# Patient Record
Sex: MALE | Race: WHITE | NOT HISPANIC OR LATINO | Employment: FULL TIME | URBAN - METROPOLITAN AREA
[De-identification: names, ages, dates, MRNs, and addresses within clinical notes are randomized per-mention and may not be internally consistent; named-entity substitution may affect disease eponyms.]

---

## 2020-06-23 ENCOUNTER — OFFICE VISIT (OUTPATIENT)
Dept: URGENT CARE | Facility: CLINIC | Age: 25
End: 2020-06-23
Payer: COMMERCIAL

## 2020-06-23 VITALS — HEART RATE: 72 BPM | OXYGEN SATURATION: 98 % | TEMPERATURE: 98 F

## 2020-06-23 DIAGNOSIS — Z20.822 CLOSE EXPOSURE TO COVID-19 VIRUS: ICD-10-CM

## 2020-06-23 DIAGNOSIS — Z11.59 SCREENING FOR VIRAL DISEASE: Primary | ICD-10-CM

## 2020-06-23 PROCEDURE — 99214 OFFICE O/P EST MOD 30 MIN: CPT | Mod: S$GLB,,, | Performed by: NURSE PRACTITIONER

## 2020-06-23 PROCEDURE — U0003 INFECTIOUS AGENT DETECTION BY NUCLEIC ACID (DNA OR RNA); SEVERE ACUTE RESPIRATORY SYNDROME CORONAVIRUS 2 (SARS-COV-2) (CORONAVIRUS DISEASE [COVID-19]), AMPLIFIED PROBE TECHNIQUE, MAKING USE OF HIGH THROUGHPUT TECHNOLOGIES AS DESCRIBED BY CMS-2020-01-R: HCPCS

## 2020-06-23 PROCEDURE — 99214 PR OFFICE/OUTPT VISIT, EST, LEVL IV, 30-39 MIN: ICD-10-PCS | Mod: S$GLB,,, | Performed by: NURSE PRACTITIONER

## 2020-06-23 NOTE — PATIENT INSTRUCTIONS
Someone will call you from [facility] within 24-48 hours to discuss lab results.       Instructions for Patients with Confirmed or Suspected COVID-19    If you are awaiting your test result, you will either be called or it will be released to the patient portal.  If you have any questions about your test, please visit [email or web address] or call our COVID-19 information line at [phone].       Stay home and stay away from family members and friends. The CDC says, you can leave home after these three things have happened: 1) You have had no fever for at least 72 hours (that is three full days of no fever without the use of medicine that reduces fevers) 2) AND other symptoms have improved (for example, when your cough or shortness of breath have improved) 3) AND at least 10 days have passed since your symptoms first appeared.   Separate yourself from other people and animals in your home.   Call ahead before visiting your doctor.   Wear a facemask.   Cover your coughs and sneezes.   Wash your hands often with soap and water; hand  can be used, too.   Avoid sharing personal household items.   Wipe down surfaces used daily.   Monitor your symptoms. Seek prompt medical attention if your illness is worsening (e.g., difficulty breathing).    Before seeking care, call your healthcare provider.   If you have a medical emergency and need to call 911, notify the dispatch personnel that you have, or are being evaluated for COVID-19. If possible, put on a facemask before emergency medical services arrive.        Recommended precautions for household members, intimate partners, and caregivers in a home setting of a patient with symptomatic laboratory-confirmed COVID-19 or a patient under investigation.  Household members, intimate partners, and caregivers in the home setting awaiting tests results have close contact with a person with symptomatic, laboratory-confirmed COVID-19 or a person under  investigation. Close contacts should monitor their health; they should call their provider right away if they develop symptoms suggestive of COVID-19 (e.g., fever, cough, shortness of breath).    Close contacts should also follow these recommendations:   Make sure that you understand and can help the patient follow their provider's instructions for medication(s) and care. You should help the patient with basic needs in the home and provide support for getting groceries, prescriptions, and other personal needs.   Monitor the patient's symptoms. If the patient is getting sicker, call his or her healthcare provider and tell them that the patient has laboratory-confirmed COVID-19. If the patient has a medical emergency and you need to call 911, notify the dispatch personnel that the patient has, or is being evaluated for COVID-19.   Household members should stay in another room or be  from the patient. Household members should use a separate bedroom and bathroom, if available.   Prohibit visitors.   Household members should care for any pets in the home.   Make sure that shared spaces in the home have good air flow, such as by an air conditioner or an opened window, weather permitting.   Perform hand hygiene frequently. Wash your hands often with soap and water for at least 20 seconds or use an alcohol-based hand  (that contains > 60% alcohol) covering all surfaces of your hands and rubbing them together until they feel dry. Soap and water should be used preferentially.   Avoid touching your eyes, nose, and mouth.   The patient should wear a facemask. If the patient is not able to wear a facemask (for example, because it causes trouble breathing), caregivers should wear a mask when they are in the same room as the patient.   Wear a disposable facemask and gloves when you touch or have contact with the patient's blood, stool, or body fluids, such as saliva, sputum, nasal mucus, vomit,  urine.  o Throw out disposable facemasks and gloves after using them. Do not reuse.  o When removing personal protective equipment, first remove and dispose of gloves. Then, immediately clean your hands with soap and water or alcohol-based hand . Next, remove and dispose of facemask, and immediately clean your hands again with soap and water or alcohol-based hand .   You should not share dishes, drinking glasses, cups, eating utensils, towels, bedding, or other items with the patient. After the patient uses these items, you should wash them thoroughly (see below Wash laundry thoroughly).   Clean all high-touch surfaces, such as counters, tabletops, doorknobs, bathroom fixtures, toilets, phones, keyboards, tablets, and bedside tables, every day. Also, clean any surfaces that may have blood, stool, or body fluids on them.   Use a household cleaning spray or wipe, according to the label instructions. Labels contain instructions for safe and effective use of the cleaning product including precautions you should take when applying the product, such as wearing gloves and making sure you have good ventilation during use of the product.   Wash laundry thoroughly.  o Immediately remove and wash clothes or bedding that have blood, stool, or body fluids on them.  o Wear disposable gloves while handling soiled items and keep soiled items away from your body. Clean your hands (with soap and water or an alcohol-based hand ) immediately after removing your gloves.  o Read and follow directions on labels of laundry or clothing items and detergent. In general, using a normal laundry detergent according to washing machine instructions and dry thoroughly using the warmest temperatures recommended on the clothing label.   Place all used disposable gloves, facemasks, and other contaminated items in a lined container before disposing of them with other household waste. Clean your hands (with soap and  water or an alcohol-based hand ) immediately after handling these items. Soap and water should be used preferentially if hands are visibly dirty.   Discuss any additional questions with your state or local health department or healthcare provider. Check available hours when contacting your local health department.    For more information see CDC link below.      https://www.cdc.gov/coronavirus/2019-ncov/hcp/guidance-prevent-spread.html#precautions        Sources:  CDC, [place] Department of Health and [facility]    If you were prescribed a narcotic or controlled medication, do not drive or operate heavy equipment or machinery while taking these medications.  You must understand that you've received an Urgent Care treatment only and that you may be released before all your medical problems are known or treated. You, the patient, will arrange for follow up care as instructed.  Follow up with your PCP or specialty clinic as directed within 2-5 days if not improved or as needed.  You can call [phone] to schedule an appointment with the appropriate provider.  If your condition worsens we recommend that you receive another evaluation at the emergency room immediately or contact your primary medical clinics after hours call service to discuss your concerns.  Please return here or go to the Emergency Department for any concerns or worsening of condition.

## 2020-06-23 NOTE — PROGRESS NOTES
Subjective:       Patient ID: Yamilka Azul is a 24 y.o. male.    Vitals:  temperature is 98.1 °F (36.7 °C). His pulse is 72. His oxygen saturation is 98%.     Chief Complaint: Sore Throat    Patient presents with c.o sore throat and fatigue that started on Friday. Patient reports exposure to covid. Patient with a intermittent cough. No sob or fever.     Sore Throat   This is a new problem. Episode onset: Friday. The problem has been unchanged. Neither side of throat is experiencing more pain than the other. There has been no fever. Associated symptoms include diarrhea and headaches. Pertinent negatives include no congestion, coughing, ear pain, shortness of breath, stridor or vomiting. Treatments tried: advil. The treatment provided moderate relief.       Constitution: Positive for fatigue. Negative for chills, sweating and fever.   HENT: Positive for sore throat. Negative for ear pain, congestion, sinus pain, sinus pressure and voice change.    Neck: Negative for painful lymph nodes.   Eyes: Negative for eye redness.   Respiratory: Negative for chest tightness, cough, sputum production, bloody sputum, COPD, shortness of breath, stridor, wheezing and asthma.    Gastrointestinal: Positive for diarrhea. Negative for nausea and vomiting.   Musculoskeletal: Negative for muscle ache.   Skin: Negative for rash.   Allergic/Immunologic: Negative for seasonal allergies and asthma.   Neurological: Positive for headaches.   Hematologic/Lymphatic: Negative for swollen lymph nodes.       Objective:      Physical Exam   Patient was seen remotely due to State of Emergency for the COVID-19 outbreak.  Patient agreed to remote visit. Patient did not appear to be in any distress via remote visit. Patient reported recent Close exposure to COVID. ER precautions discussed. Patient verbalized understanding.       Assessment:       1. Screening for viral disease    2. Close Exposure to Covid-19 Virus        Plan:         Screening for  viral disease  -     COVID-19 Routine Screening    Close Exposure to Covid-19 Virus  -     COVID-19 Routine Screening      Patient Instructions   Someone will call you from [facility] within 24-48 hours to discuss lab results.       Instructions for Patients with Confirmed or Suspected COVID-19    If you are awaiting your test result, you will either be called or it will be released to the patient portal.  If you have any questions about your test, please visit [email or web address] or call our COVID-19 information line at [phone].       Stay home and stay away from family members and friends. The CDC says, you can leave home after these three things have happened: 1) You have had no fever for at least 72 hours (that is three full days of no fever without the use of medicine that reduces fevers) 2) AND other symptoms have improved (for example, when your cough or shortness of breath have improved) 3) AND at least 10 days have passed since your symptoms first appeared.   Separate yourself from other people and animals in your home.   Call ahead before visiting your doctor.   Wear a facemask.   Cover your coughs and sneezes.   Wash your hands often with soap and water; hand  can be used, too.   Avoid sharing personal household items.   Wipe down surfaces used daily.   Monitor your symptoms. Seek prompt medical attention if your illness is worsening (e.g., difficulty breathing).    Before seeking care, call your healthcare provider.   If you have a medical emergency and need to call 911, notify the dispatch personnel that you have, or are being evaluated for COVID-19. If possible, put on a facemask before emergency medical services arrive.        Recommended precautions for household members, intimate partners, and caregivers in a home setting of a patient with symptomatic laboratory-confirmed COVID-19 or a patient under investigation.  Household members, intimate partners, and caregivers  in the home setting awaiting tests results have close contact with a person with symptomatic, laboratory-confirmed COVID-19 or a person under investigation. Close contacts should monitor their health; they should call their provider right away if they develop symptoms suggestive of COVID-19 (e.g., fever, cough, shortness of breath).    Close contacts should also follow these recommendations:   Make sure that you understand and can help the patient follow their provider's instructions for medication(s) and care. You should help the patient with basic needs in the home and provide support for getting groceries, prescriptions, and other personal needs.   Monitor the patient's symptoms. If the patient is getting sicker, call his or her healthcare provider and tell them that the patient has laboratory-confirmed COVID-19. If the patient has a medical emergency and you need to call 911, notify the dispatch personnel that the patient has, or is being evaluated for COVID-19.   Household members should stay in another room or be  from the patient. Household members should use a separate bedroom and bathroom, if available.   Prohibit visitors.   Household members should care for any pets in the home.   Make sure that shared spaces in the home have good air flow, such as by an air conditioner or an opened window, weather permitting.   Perform hand hygiene frequently. Wash your hands often with soap and water for at least 20 seconds or use an alcohol-based hand  (that contains > 60% alcohol) covering all surfaces of your hands and rubbing them together until they feel dry. Soap and water should be used preferentially.   Avoid touching your eyes, nose, and mouth.   The patient should wear a facemask. If the patient is not able to wear a facemask (for example, because it causes trouble breathing), caregivers should wear a mask when they are in the same room as the patient.   Wear a disposable facemask  and gloves when you touch or have contact with the patient's blood, stool, or body fluids, such as saliva, sputum, nasal mucus, vomit, urine.  o Throw out disposable facemasks and gloves after using them. Do not reuse.  o When removing personal protective equipment, first remove and dispose of gloves. Then, immediately clean your hands with soap and water or alcohol-based hand . Next, remove and dispose of facemask, and immediately clean your hands again with soap and water or alcohol-based hand .   You should not share dishes, drinking glasses, cups, eating utensils, towels, bedding, or other items with the patient. After the patient uses these items, you should wash them thoroughly (see below Wash laundry thoroughly).   Clean all high-touch surfaces, such as counters, tabletops, doorknobs, bathroom fixtures, toilets, phones, keyboards, tablets, and bedside tables, every day. Also, clean any surfaces that may have blood, stool, or body fluids on them.   Use a household cleaning spray or wipe, according to the label instructions. Labels contain instructions for safe and effective use of the cleaning product including precautions you should take when applying the product, such as wearing gloves and making sure you have good ventilation during use of the product.   Wash laundry thoroughly.  o Immediately remove and wash clothes or bedding that have blood, stool, or body fluids on them.  o Wear disposable gloves while handling soiled items and keep soiled items away from your body. Clean your hands (with soap and water or an alcohol-based hand ) immediately after removing your gloves.  o Read and follow directions on labels of laundry or clothing items and detergent. In general, using a normal laundry detergent according to washing machine instructions and dry thoroughly using the warmest temperatures recommended on the clothing label.   Place all used disposable gloves, facemasks,  and other contaminated items in a lined container before disposing of them with other household waste. Clean your hands (with soap and water or an alcohol-based hand ) immediately after handling these items. Soap and water should be used preferentially if hands are visibly dirty.   Discuss any additional questions with your state or local health department or healthcare provider. Check available hours when contacting your local health department.    For more information see CDC link below.      https://www.cdc.gov/coronavirus/2019-ncov/hcp/guidance-prevent-spread.html#precautions        Sources:  [facility], [place] Department [facility] and [facility]    If you were prescribed a narcotic or controlled medication, do not drive or operate heavy equipment or machinery while taking these medications.  You must understand that you've received an Urgent Care treatment only and that you may be released before all your medical problems are known or treated. You, the patient, will arrange for follow up care as instructed.  Follow up with your PCP or specialty clinic as directed within 2-5 days if not improved or as needed.  You can call [phone] to schedule an appointment with the appropriate provider.  If your condition worsens we recommend that you receive another evaluation at the emergency room immediately or contact your primary medical clinics after hours call service to discuss your concerns.  Please return here or go to the Emergency Department for any concerns or worsening of condition.

## 2020-06-25 LAB — SARS-COV-2 RNA RESP QL NAA+PROBE: NOT DETECTED

## 2021-02-22 ENCOUNTER — TELEPHONE (OUTPATIENT)
Dept: ENDOCRINOLOGY | Facility: CLINIC | Age: 26
End: 2021-02-22

## 2021-02-25 ENCOUNTER — TELEPHONE (OUTPATIENT)
Dept: ENDOCRINOLOGY | Facility: CLINIC | Age: 26
End: 2021-02-25

## 2021-02-26 ENCOUNTER — PATIENT OUTREACH (OUTPATIENT)
Dept: ENDOCRINOLOGY | Facility: CLINIC | Age: 26
End: 2021-02-26

## 2021-02-26 ENCOUNTER — OFFICE VISIT (OUTPATIENT)
Dept: ENDOCRINOLOGY | Facility: CLINIC | Age: 26
End: 2021-02-26
Payer: COMMERCIAL

## 2021-02-26 ENCOUNTER — LAB VISIT (OUTPATIENT)
Dept: LAB | Facility: HOSPITAL | Age: 26
End: 2021-02-26
Attending: INTERNAL MEDICINE
Payer: COMMERCIAL

## 2021-02-26 ENCOUNTER — TELEPHONE (OUTPATIENT)
Dept: ENDOCRINOLOGY | Facility: CLINIC | Age: 26
End: 2021-02-26

## 2021-02-26 VITALS
SYSTOLIC BLOOD PRESSURE: 128 MMHG | WEIGHT: 172.06 LBS | DIASTOLIC BLOOD PRESSURE: 86 MMHG | OXYGEN SATURATION: 99 % | BODY MASS INDEX: 24.63 KG/M2 | RESPIRATION RATE: 17 BRPM | HEIGHT: 70 IN | HEART RATE: 77 BPM

## 2021-02-26 DIAGNOSIS — Z96.41 INSULIN PUMP STATUS: ICD-10-CM

## 2021-02-26 DIAGNOSIS — E10.9 TYPE 1 DIABETES MELLITUS WITHOUT COMPLICATION: Primary | ICD-10-CM

## 2021-02-26 DIAGNOSIS — E10.9 TYPE 1 DIABETES MELLITUS WITHOUT COMPLICATION: ICD-10-CM

## 2021-02-26 LAB
ESTIMATED AVG GLUCOSE: 148 MG/DL (ref 68–131)
HBA1C MFR BLD: 6.8 % (ref 4–5.6)

## 2021-02-26 PROCEDURE — 3008F PR BODY MASS INDEX (BMI) DOCUMENTED: ICD-10-PCS | Mod: CPTII,S$GLB,, | Performed by: INTERNAL MEDICINE

## 2021-02-26 PROCEDURE — 83036 HEMOGLOBIN GLYCOSYLATED A1C: CPT

## 2021-02-26 PROCEDURE — 99999 PR PBB SHADOW E&M-EST. PATIENT-LVL IV: ICD-10-PCS | Mod: PBBFAC,,, | Performed by: INTERNAL MEDICINE

## 2021-02-26 PROCEDURE — 99999 PR PBB SHADOW E&M-EST. PATIENT-LVL IV: CPT | Mod: PBBFAC,,, | Performed by: INTERNAL MEDICINE

## 2021-02-26 PROCEDURE — 1126F AMNT PAIN NOTED NONE PRSNT: CPT | Mod: S$GLB,,, | Performed by: INTERNAL MEDICINE

## 2021-02-26 PROCEDURE — 3008F BODY MASS INDEX DOCD: CPT | Mod: CPTII,S$GLB,, | Performed by: INTERNAL MEDICINE

## 2021-02-26 PROCEDURE — 99205 PR OFFICE/OUTPT VISIT, NEW, LEVL V, 60-74 MIN: ICD-10-PCS | Mod: S$GLB,,, | Performed by: INTERNAL MEDICINE

## 2021-02-26 PROCEDURE — 99205 OFFICE O/P NEW HI 60 MIN: CPT | Mod: S$GLB,,, | Performed by: INTERNAL MEDICINE

## 2021-02-26 PROCEDURE — 80061 LIPID PANEL: CPT

## 2021-02-26 PROCEDURE — 84443 ASSAY THYROID STIM HORMONE: CPT

## 2021-02-26 PROCEDURE — 1126F PR PAIN SEVERITY QUANTIFIED, NO PAIN PRESENT: ICD-10-PCS | Mod: S$GLB,,, | Performed by: INTERNAL MEDICINE

## 2021-02-26 PROCEDURE — 36415 COLL VENOUS BLD VENIPUNCTURE: CPT

## 2021-02-26 PROCEDURE — 80053 COMPREHEN METABOLIC PANEL: CPT

## 2021-02-26 RX ORDER — INSULIN GLARGINE 100 [IU]/ML
INJECTION, SOLUTION SUBCUTANEOUS
Qty: 3 SYRINGE | Refills: 1 | Status: SHIPPED | OUTPATIENT
Start: 2021-02-26 | End: 2021-02-26

## 2021-02-26 RX ORDER — INSULIN GLARGINE 100 [IU]/ML
INJECTION, SOLUTION SUBCUTANEOUS
Qty: 3 SYRINGE | Refills: 1 | Status: SHIPPED | OUTPATIENT
Start: 2021-02-26

## 2021-02-26 RX ORDER — GLUCAGON 3 MG/1
1 POWDER NASAL
Qty: 2 EACH | Refills: 3 | Status: SHIPPED | OUTPATIENT
Start: 2021-02-26 | End: 2023-03-10

## 2021-02-27 LAB
ALBUMIN SERPL BCP-MCNC: 4.4 G/DL (ref 3.5–5.2)
ALP SERPL-CCNC: 84 U/L (ref 55–135)
ALT SERPL W/O P-5'-P-CCNC: 44 U/L (ref 10–44)
ANION GAP SERPL CALC-SCNC: 6 MMOL/L (ref 8–16)
AST SERPL-CCNC: 22 U/L (ref 10–40)
BILIRUB SERPL-MCNC: 0.7 MG/DL (ref 0.1–1)
BUN SERPL-MCNC: 12 MG/DL (ref 6–20)
CALCIUM SERPL-MCNC: 9.4 MG/DL (ref 8.7–10.5)
CHLORIDE SERPL-SCNC: 105 MMOL/L (ref 95–110)
CHOLEST SERPL-MCNC: 179 MG/DL (ref 120–199)
CHOLEST/HDLC SERPL: 2.5 {RATIO} (ref 2–5)
CO2 SERPL-SCNC: 29 MMOL/L (ref 23–29)
CREAT SERPL-MCNC: 1 MG/DL (ref 0.5–1.4)
EST. GFR  (AFRICAN AMERICAN): >60 ML/MIN/1.73 M^2
EST. GFR  (NON AFRICAN AMERICAN): >60 ML/MIN/1.73 M^2
GLUCOSE SERPL-MCNC: 199 MG/DL (ref 70–110)
HDLC SERPL-MCNC: 72 MG/DL (ref 40–75)
HDLC SERPL: 40.2 % (ref 20–50)
LDLC SERPL CALC-MCNC: 101.2 MG/DL (ref 63–159)
NONHDLC SERPL-MCNC: 107 MG/DL
POTASSIUM SERPL-SCNC: 5 MMOL/L (ref 3.5–5.1)
PROT SERPL-MCNC: 7 G/DL (ref 6–8.4)
SODIUM SERPL-SCNC: 140 MMOL/L (ref 136–145)
TRIGL SERPL-MCNC: 29 MG/DL (ref 30–150)
TSH SERPL DL<=0.005 MIU/L-ACNC: 0.53 UIU/ML (ref 0.4–4)

## 2021-02-28 ENCOUNTER — PATIENT MESSAGE (OUTPATIENT)
Dept: ENDOCRINOLOGY | Facility: CLINIC | Age: 26
End: 2021-02-28

## 2021-04-30 ENCOUNTER — OFFICE VISIT (OUTPATIENT)
Dept: ENDOCRINOLOGY | Facility: CLINIC | Age: 26
End: 2021-04-30
Payer: COMMERCIAL

## 2021-04-30 VITALS
WEIGHT: 168.19 LBS | SYSTOLIC BLOOD PRESSURE: 110 MMHG | DIASTOLIC BLOOD PRESSURE: 76 MMHG | HEART RATE: 76 BPM | BODY MASS INDEX: 24.08 KG/M2 | HEIGHT: 70 IN | RESPIRATION RATE: 16 BRPM

## 2021-04-30 DIAGNOSIS — Z96.41 INSULIN PUMP STATUS: ICD-10-CM

## 2021-04-30 DIAGNOSIS — E10.9 TYPE 1 DIABETES MELLITUS WITHOUT COMPLICATION: Primary | ICD-10-CM

## 2021-04-30 PROCEDURE — 99214 PR OFFICE/OUTPT VISIT, EST, LEVL IV, 30-39 MIN: ICD-10-PCS | Mod: 25,S$GLB,, | Performed by: INTERNAL MEDICINE

## 2021-04-30 PROCEDURE — 3044F PR MOST RECENT HEMOGLOBIN A1C LEVEL <7.0%: ICD-10-PCS | Mod: CPTII,S$GLB,, | Performed by: INTERNAL MEDICINE

## 2021-04-30 PROCEDURE — 3008F BODY MASS INDEX DOCD: CPT | Mod: CPTII,S$GLB,, | Performed by: INTERNAL MEDICINE

## 2021-04-30 PROCEDURE — 99999 PR PBB SHADOW E&M-EST. PATIENT-LVL IV: CPT | Mod: PBBFAC,,, | Performed by: INTERNAL MEDICINE

## 2021-04-30 PROCEDURE — 95251 CONT GLUC MNTR ANALYSIS I&R: CPT | Mod: S$GLB,,, | Performed by: INTERNAL MEDICINE

## 2021-04-30 PROCEDURE — 99214 OFFICE O/P EST MOD 30 MIN: CPT | Mod: 25,S$GLB,, | Performed by: INTERNAL MEDICINE

## 2021-04-30 PROCEDURE — 3008F PR BODY MASS INDEX (BMI) DOCUMENTED: ICD-10-PCS | Mod: CPTII,S$GLB,, | Performed by: INTERNAL MEDICINE

## 2021-04-30 PROCEDURE — 95251 PR GLUCOSE MONITOR, 72 HOUR, PHYS INTERP: ICD-10-PCS | Mod: S$GLB,,, | Performed by: INTERNAL MEDICINE

## 2021-04-30 PROCEDURE — 99999 PR PBB SHADOW E&M-EST. PATIENT-LVL IV: ICD-10-PCS | Mod: PBBFAC,,, | Performed by: INTERNAL MEDICINE

## 2021-04-30 PROCEDURE — 3044F HG A1C LEVEL LT 7.0%: CPT | Mod: CPTII,S$GLB,, | Performed by: INTERNAL MEDICINE

## 2021-04-30 PROCEDURE — 1126F AMNT PAIN NOTED NONE PRSNT: CPT | Mod: S$GLB,,, | Performed by: INTERNAL MEDICINE

## 2021-04-30 PROCEDURE — 1126F PR PAIN SEVERITY QUANTIFIED, NO PAIN PRESENT: ICD-10-PCS | Mod: S$GLB,,, | Performed by: INTERNAL MEDICINE

## 2021-05-14 ENCOUNTER — OFFICE VISIT (OUTPATIENT)
Dept: OPTOMETRY | Facility: CLINIC | Age: 26
End: 2021-05-14
Payer: COMMERCIAL

## 2021-05-14 DIAGNOSIS — E10.9 TYPE 1 DIABETES MELLITUS WITHOUT COMPLICATION: Primary | ICD-10-CM

## 2021-05-14 DIAGNOSIS — H52.13 MYOPIA OF BOTH EYES: ICD-10-CM

## 2021-05-14 PROCEDURE — 2023F PR DILATED RETINAL EXAM W/O EVID OF RETINOPATHY: ICD-10-PCS | Mod: S$GLB,,, | Performed by: OPTOMETRIST

## 2021-05-14 PROCEDURE — 92015 PR REFRACTION: ICD-10-PCS | Mod: S$GLB,,, | Performed by: OPTOMETRIST

## 2021-05-14 PROCEDURE — 92015 DETERMINE REFRACTIVE STATE: CPT | Mod: S$GLB,,, | Performed by: OPTOMETRIST

## 2021-05-14 PROCEDURE — 3044F HG A1C LEVEL LT 7.0%: CPT | Mod: CPTII,S$GLB,, | Performed by: OPTOMETRIST

## 2021-05-14 PROCEDURE — 3044F PR MOST RECENT HEMOGLOBIN A1C LEVEL <7.0%: ICD-10-PCS | Mod: CPTII,S$GLB,, | Performed by: OPTOMETRIST

## 2021-05-14 PROCEDURE — 1126F PR PAIN SEVERITY QUANTIFIED, NO PAIN PRESENT: ICD-10-PCS | Mod: S$GLB,,, | Performed by: OPTOMETRIST

## 2021-05-14 PROCEDURE — 2023F DILAT RTA XM W/O RTNOPTHY: CPT | Mod: S$GLB,,, | Performed by: OPTOMETRIST

## 2021-05-14 PROCEDURE — 1126F AMNT PAIN NOTED NONE PRSNT: CPT | Mod: S$GLB,,, | Performed by: OPTOMETRIST

## 2021-05-14 PROCEDURE — 99999 PR PBB SHADOW E&M-EST. PATIENT-LVL II: CPT | Mod: PBBFAC,,, | Performed by: OPTOMETRIST

## 2021-05-14 PROCEDURE — 92004 COMPRE OPH EXAM NEW PT 1/>: CPT | Mod: S$GLB,,, | Performed by: OPTOMETRIST

## 2021-05-14 PROCEDURE — 99999 PR PBB SHADOW E&M-EST. PATIENT-LVL II: ICD-10-PCS | Mod: PBBFAC,,, | Performed by: OPTOMETRIST

## 2021-05-14 PROCEDURE — 92004 PR EYE EXAM, NEW PATIENT,COMPREHESV: ICD-10-PCS | Mod: S$GLB,,, | Performed by: OPTOMETRIST

## 2021-07-23 ENCOUNTER — LAB VISIT (OUTPATIENT)
Dept: LAB | Facility: HOSPITAL | Age: 26
End: 2021-07-23
Attending: INTERNAL MEDICINE
Payer: COMMERCIAL

## 2021-07-23 DIAGNOSIS — E10.9 TYPE 1 DIABETES MELLITUS WITHOUT COMPLICATION: ICD-10-CM

## 2021-07-23 LAB
ALBUMIN SERPL BCP-MCNC: 4.3 G/DL (ref 3.5–5.2)
ALP SERPL-CCNC: 96 U/L (ref 55–135)
ALT SERPL W/O P-5'-P-CCNC: 78 U/L (ref 10–44)
ANION GAP SERPL CALC-SCNC: 9 MMOL/L (ref 8–16)
AST SERPL-CCNC: 59 U/L (ref 10–40)
BILIRUB SERPL-MCNC: 0.4 MG/DL (ref 0.1–1)
BUN SERPL-MCNC: 13 MG/DL (ref 6–20)
C PEPTIDE SERPL-MCNC: <0.08 NG/ML (ref 0.78–5.19)
CALCIUM SERPL-MCNC: 10 MG/DL (ref 8.7–10.5)
CHLORIDE SERPL-SCNC: 107 MMOL/L (ref 95–110)
CO2 SERPL-SCNC: 26 MMOL/L (ref 23–29)
CREAT SERPL-MCNC: 0.9 MG/DL (ref 0.5–1.4)
EST. GFR  (AFRICAN AMERICAN): >60 ML/MIN/1.73 M^2
EST. GFR  (NON AFRICAN AMERICAN): >60 ML/MIN/1.73 M^2
ESTIMATED AVG GLUCOSE: 131 MG/DL (ref 68–131)
GLUCOSE SERPL-MCNC: 125 MG/DL (ref 70–110)
HBA1C MFR BLD: 6.2 % (ref 4–5.6)
POTASSIUM SERPL-SCNC: 4.2 MMOL/L (ref 3.5–5.1)
PROT SERPL-MCNC: 7.2 G/DL (ref 6–8.4)
SODIUM SERPL-SCNC: 142 MMOL/L (ref 136–145)

## 2021-07-23 PROCEDURE — 83036 HEMOGLOBIN GLYCOSYLATED A1C: CPT | Performed by: INTERNAL MEDICINE

## 2021-07-23 PROCEDURE — 86341 ISLET CELL ANTIBODY: CPT | Mod: 91 | Performed by: INTERNAL MEDICINE

## 2021-07-23 PROCEDURE — 36415 COLL VENOUS BLD VENIPUNCTURE: CPT | Performed by: INTERNAL MEDICINE

## 2021-07-23 PROCEDURE — 84681 ASSAY OF C-PEPTIDE: CPT | Performed by: INTERNAL MEDICINE

## 2021-07-23 PROCEDURE — 80053 COMPREHEN METABOLIC PANEL: CPT | Performed by: INTERNAL MEDICINE

## 2021-07-23 PROCEDURE — 86341 ISLET CELL ANTIBODY: CPT | Performed by: INTERNAL MEDICINE

## 2021-07-26 LAB — GAD65 AB SER-SCNC: 0 NMOL/L

## 2021-07-27 LAB — PANC ISLET CELL IGG SER-ACNC: NORMAL

## 2021-07-28 ENCOUNTER — PATIENT MESSAGE (OUTPATIENT)
Dept: ENDOCRINOLOGY | Facility: CLINIC | Age: 26
End: 2021-07-28

## 2021-07-28 DIAGNOSIS — R79.89 ELEVATED LFTS: Primary | ICD-10-CM

## 2021-08-06 ENCOUNTER — OFFICE VISIT (OUTPATIENT)
Dept: URGENT CARE | Facility: CLINIC | Age: 26
End: 2021-08-06
Payer: COMMERCIAL

## 2021-08-06 VITALS
WEIGHT: 168 LBS | OXYGEN SATURATION: 97 % | HEART RATE: 83 BPM | TEMPERATURE: 99 F | SYSTOLIC BLOOD PRESSURE: 145 MMHG | DIASTOLIC BLOOD PRESSURE: 81 MMHG | HEIGHT: 70 IN | BODY MASS INDEX: 24.05 KG/M2

## 2021-08-06 DIAGNOSIS — R05.9 COUGH: ICD-10-CM

## 2021-08-06 DIAGNOSIS — J20.9 ACUTE BRONCHITIS, UNSPECIFIED ORGANISM: Primary | ICD-10-CM

## 2021-08-06 LAB
CTP QC/QA: YES
SARS-COV-2 RDRP RESP QL NAA+PROBE: NEGATIVE

## 2021-08-06 PROCEDURE — 1159F MED LIST DOCD IN RCRD: CPT | Mod: CPTII,S$GLB,, | Performed by: PHYSICIAN ASSISTANT

## 2021-08-06 PROCEDURE — 3077F PR MOST RECENT SYSTOLIC BLOOD PRESSURE >= 140 MM HG: ICD-10-PCS | Mod: CPTII,S$GLB,, | Performed by: PHYSICIAN ASSISTANT

## 2021-08-06 PROCEDURE — 3044F HG A1C LEVEL LT 7.0%: CPT | Mod: CPTII,S$GLB,, | Performed by: PHYSICIAN ASSISTANT

## 2021-08-06 PROCEDURE — 3079F PR MOST RECENT DIASTOLIC BLOOD PRESSURE 80-89 MM HG: ICD-10-PCS | Mod: CPTII,S$GLB,, | Performed by: PHYSICIAN ASSISTANT

## 2021-08-06 PROCEDURE — 3008F PR BODY MASS INDEX (BMI) DOCUMENTED: ICD-10-PCS | Mod: CPTII,S$GLB,, | Performed by: PHYSICIAN ASSISTANT

## 2021-08-06 PROCEDURE — 1159F PR MEDICATION LIST DOCUMENTED IN MEDICAL RECORD: ICD-10-PCS | Mod: CPTII,S$GLB,, | Performed by: PHYSICIAN ASSISTANT

## 2021-08-06 PROCEDURE — 1126F PR PAIN SEVERITY QUANTIFIED, NO PAIN PRESENT: ICD-10-PCS | Mod: CPTII,S$GLB,, | Performed by: PHYSICIAN ASSISTANT

## 2021-08-06 PROCEDURE — 71046 X-RAY EXAM CHEST 2 VIEWS: CPT | Mod: FY,S$GLB,, | Performed by: STUDENT IN AN ORGANIZED HEALTH CARE EDUCATION/TRAINING PROGRAM

## 2021-08-06 PROCEDURE — 3079F DIAST BP 80-89 MM HG: CPT | Mod: CPTII,S$GLB,, | Performed by: PHYSICIAN ASSISTANT

## 2021-08-06 PROCEDURE — 99214 PR OFFICE/OUTPT VISIT, EST, LEVL IV, 30-39 MIN: ICD-10-PCS | Mod: S$GLB,,, | Performed by: PHYSICIAN ASSISTANT

## 2021-08-06 PROCEDURE — 3077F SYST BP >= 140 MM HG: CPT | Mod: CPTII,S$GLB,, | Performed by: PHYSICIAN ASSISTANT

## 2021-08-06 PROCEDURE — 1160F PR REVIEW ALL MEDS BY PRESCRIBER/CLIN PHARMACIST DOCUMENTED: ICD-10-PCS | Mod: CPTII,S$GLB,, | Performed by: PHYSICIAN ASSISTANT

## 2021-08-06 PROCEDURE — 1126F AMNT PAIN NOTED NONE PRSNT: CPT | Mod: CPTII,S$GLB,, | Performed by: PHYSICIAN ASSISTANT

## 2021-08-06 PROCEDURE — 71046 XR CHEST PA AND LATERAL: ICD-10-PCS | Mod: FY,S$GLB,, | Performed by: STUDENT IN AN ORGANIZED HEALTH CARE EDUCATION/TRAINING PROGRAM

## 2021-08-06 PROCEDURE — 3044F PR MOST RECENT HEMOGLOBIN A1C LEVEL <7.0%: ICD-10-PCS | Mod: CPTII,S$GLB,, | Performed by: PHYSICIAN ASSISTANT

## 2021-08-06 PROCEDURE — 1160F RVW MEDS BY RX/DR IN RCRD: CPT | Mod: CPTII,S$GLB,, | Performed by: PHYSICIAN ASSISTANT

## 2021-08-06 PROCEDURE — U0002 COVID-19 LAB TEST NON-CDC: HCPCS | Mod: QW,S$GLB,, | Performed by: PHYSICIAN ASSISTANT

## 2021-08-06 PROCEDURE — 3008F BODY MASS INDEX DOCD: CPT | Mod: CPTII,S$GLB,, | Performed by: PHYSICIAN ASSISTANT

## 2021-08-06 PROCEDURE — 99214 OFFICE O/P EST MOD 30 MIN: CPT | Mod: S$GLB,,, | Performed by: PHYSICIAN ASSISTANT

## 2021-08-06 PROCEDURE — U0002: ICD-10-PCS | Mod: QW,S$GLB,, | Performed by: PHYSICIAN ASSISTANT

## 2021-08-06 RX ORDER — BENZONATATE 100 MG/1
200 CAPSULE ORAL 3 TIMES DAILY PRN
Qty: 40 CAPSULE | Refills: 0 | Status: SHIPPED | OUTPATIENT
Start: 2021-08-06 | End: 2021-08-13

## 2021-08-06 RX ORDER — ALBUTEROL SULFATE 90 UG/1
2 AEROSOL, METERED RESPIRATORY (INHALATION) EVERY 4 HOURS PRN
Qty: 6.7 G | Refills: 0 | Status: SHIPPED | OUTPATIENT
Start: 2021-08-06

## 2021-08-14 ENCOUNTER — TELEPHONE (OUTPATIENT)
Dept: URGENT CARE | Facility: CLINIC | Age: 26
End: 2021-08-14

## 2021-08-15 ENCOUNTER — OFFICE VISIT (OUTPATIENT)
Dept: URGENT CARE | Facility: CLINIC | Age: 26
End: 2021-08-15
Payer: COMMERCIAL

## 2021-08-15 VITALS
RESPIRATION RATE: 17 BRPM | BODY MASS INDEX: 24.05 KG/M2 | DIASTOLIC BLOOD PRESSURE: 81 MMHG | SYSTOLIC BLOOD PRESSURE: 132 MMHG | WEIGHT: 168 LBS | HEIGHT: 70 IN | OXYGEN SATURATION: 97 % | TEMPERATURE: 99 F | HEART RATE: 64 BPM

## 2021-08-15 DIAGNOSIS — U07.1 COVID-19 VIRUS DETECTED: ICD-10-CM

## 2021-08-15 DIAGNOSIS — E10.9 TYPE 1 DIABETES MELLITUS WITHOUT COMPLICATION: ICD-10-CM

## 2021-08-15 DIAGNOSIS — U07.1 COVID-19 VIRUS INFECTION: Primary | ICD-10-CM

## 2021-08-15 DIAGNOSIS — R05.9 COUGH: ICD-10-CM

## 2021-08-15 LAB
CTP QC/QA: YES
SARS-COV-2 RDRP RESP QL NAA+PROBE: POSITIVE

## 2021-08-15 PROCEDURE — U0002 COVID-19 LAB TEST NON-CDC: HCPCS | Mod: QW,S$GLB,, | Performed by: NURSE PRACTITIONER

## 2021-08-15 PROCEDURE — 1159F PR MEDICATION LIST DOCUMENTED IN MEDICAL RECORD: ICD-10-PCS | Mod: CPTII,S$GLB,, | Performed by: NURSE PRACTITIONER

## 2021-08-15 PROCEDURE — 3008F PR BODY MASS INDEX (BMI) DOCUMENTED: ICD-10-PCS | Mod: CPTII,S$GLB,, | Performed by: NURSE PRACTITIONER

## 2021-08-15 PROCEDURE — 3075F SYST BP GE 130 - 139MM HG: CPT | Mod: CPTII,S$GLB,, | Performed by: NURSE PRACTITIONER

## 2021-08-15 PROCEDURE — 3008F BODY MASS INDEX DOCD: CPT | Mod: CPTII,S$GLB,, | Performed by: NURSE PRACTITIONER

## 2021-08-15 PROCEDURE — 99214 PR OFFICE/OUTPT VISIT, EST, LEVL IV, 30-39 MIN: ICD-10-PCS | Mod: S$GLB,,, | Performed by: NURSE PRACTITIONER

## 2021-08-15 PROCEDURE — 3079F PR MOST RECENT DIASTOLIC BLOOD PRESSURE 80-89 MM HG: ICD-10-PCS | Mod: CPTII,S$GLB,, | Performed by: NURSE PRACTITIONER

## 2021-08-15 PROCEDURE — 1159F MED LIST DOCD IN RCRD: CPT | Mod: CPTII,S$GLB,, | Performed by: NURSE PRACTITIONER

## 2021-08-15 PROCEDURE — 99214 OFFICE O/P EST MOD 30 MIN: CPT | Mod: S$GLB,,, | Performed by: NURSE PRACTITIONER

## 2021-08-15 PROCEDURE — 3079F DIAST BP 80-89 MM HG: CPT | Mod: CPTII,S$GLB,, | Performed by: NURSE PRACTITIONER

## 2021-08-15 PROCEDURE — U0002: ICD-10-PCS | Mod: QW,S$GLB,, | Performed by: NURSE PRACTITIONER

## 2021-08-15 PROCEDURE — 1160F PR REVIEW ALL MEDS BY PRESCRIBER/CLIN PHARMACIST DOCUMENTED: ICD-10-PCS | Mod: CPTII,S$GLB,, | Performed by: NURSE PRACTITIONER

## 2021-08-15 PROCEDURE — 3044F PR MOST RECENT HEMOGLOBIN A1C LEVEL <7.0%: ICD-10-PCS | Mod: CPTII,S$GLB,, | Performed by: NURSE PRACTITIONER

## 2021-08-15 PROCEDURE — 1160F RVW MEDS BY RX/DR IN RCRD: CPT | Mod: CPTII,S$GLB,, | Performed by: NURSE PRACTITIONER

## 2021-08-15 PROCEDURE — 3075F PR MOST RECENT SYSTOLIC BLOOD PRESS GE 130-139MM HG: ICD-10-PCS | Mod: CPTII,S$GLB,, | Performed by: NURSE PRACTITIONER

## 2021-08-15 PROCEDURE — 3044F HG A1C LEVEL LT 7.0%: CPT | Mod: CPTII,S$GLB,, | Performed by: NURSE PRACTITIONER

## 2021-08-20 ENCOUNTER — OFFICE VISIT (OUTPATIENT)
Dept: URGENT CARE | Facility: CLINIC | Age: 26
End: 2021-08-20
Payer: COMMERCIAL

## 2021-08-20 VITALS
RESPIRATION RATE: 17 BRPM | HEART RATE: 65 BPM | DIASTOLIC BLOOD PRESSURE: 83 MMHG | OXYGEN SATURATION: 98 % | WEIGHT: 168 LBS | BODY MASS INDEX: 24.05 KG/M2 | TEMPERATURE: 98 F | HEIGHT: 70 IN | SYSTOLIC BLOOD PRESSURE: 127 MMHG

## 2021-08-20 DIAGNOSIS — U07.1 COVID-19 VIRUS INFECTION: Primary | ICD-10-CM

## 2021-08-20 PROCEDURE — 1159F PR MEDICATION LIST DOCUMENTED IN MEDICAL RECORD: ICD-10-PCS | Mod: CPTII,S$GLB,, | Performed by: SURGERY

## 2021-08-20 PROCEDURE — 3008F BODY MASS INDEX DOCD: CPT | Mod: CPTII,S$GLB,, | Performed by: SURGERY

## 2021-08-20 PROCEDURE — 99214 OFFICE O/P EST MOD 30 MIN: CPT | Mod: S$GLB,,, | Performed by: SURGERY

## 2021-08-20 PROCEDURE — 3044F PR MOST RECENT HEMOGLOBIN A1C LEVEL <7.0%: ICD-10-PCS | Mod: CPTII,S$GLB,, | Performed by: SURGERY

## 2021-08-20 PROCEDURE — 3044F HG A1C LEVEL LT 7.0%: CPT | Mod: CPTII,S$GLB,, | Performed by: SURGERY

## 2021-08-20 PROCEDURE — 3008F PR BODY MASS INDEX (BMI) DOCUMENTED: ICD-10-PCS | Mod: CPTII,S$GLB,, | Performed by: SURGERY

## 2021-08-20 PROCEDURE — 99214 PR OFFICE/OUTPT VISIT, EST, LEVL IV, 30-39 MIN: ICD-10-PCS | Mod: S$GLB,,, | Performed by: SURGERY

## 2021-08-20 PROCEDURE — 3079F PR MOST RECENT DIASTOLIC BLOOD PRESSURE 80-89 MM HG: ICD-10-PCS | Mod: CPTII,S$GLB,, | Performed by: SURGERY

## 2021-08-20 PROCEDURE — 1159F MED LIST DOCD IN RCRD: CPT | Mod: CPTII,S$GLB,, | Performed by: SURGERY

## 2021-08-20 PROCEDURE — 3074F SYST BP LT 130 MM HG: CPT | Mod: CPTII,S$GLB,, | Performed by: SURGERY

## 2021-08-20 PROCEDURE — 1160F PR REVIEW ALL MEDS BY PRESCRIBER/CLIN PHARMACIST DOCUMENTED: ICD-10-PCS | Mod: CPTII,S$GLB,, | Performed by: SURGERY

## 2021-08-20 PROCEDURE — 3079F DIAST BP 80-89 MM HG: CPT | Mod: CPTII,S$GLB,, | Performed by: SURGERY

## 2021-08-20 PROCEDURE — 1160F RVW MEDS BY RX/DR IN RCRD: CPT | Mod: CPTII,S$GLB,, | Performed by: SURGERY

## 2021-08-20 PROCEDURE — 3074F PR MOST RECENT SYSTOLIC BLOOD PRESSURE < 130 MM HG: ICD-10-PCS | Mod: CPTII,S$GLB,, | Performed by: SURGERY

## 2021-09-12 ENCOUNTER — PATIENT MESSAGE (OUTPATIENT)
Dept: ENDOCRINOLOGY | Facility: CLINIC | Age: 26
End: 2021-09-12

## 2021-09-13 RX ORDER — BLOOD-GLUCOSE TRANSMITTER
2 EACH MISCELLANEOUS
Qty: 2 DEVICE | Refills: 1 | Status: SHIPPED | OUTPATIENT
Start: 2021-09-13 | End: 2022-06-13

## 2021-09-13 RX ORDER — BLOOD-GLUCOSE SENSOR
3 EACH MISCELLANEOUS
Qty: 3 DEVICE | Refills: 11 | Status: SHIPPED | OUTPATIENT
Start: 2021-09-13 | End: 2022-10-23 | Stop reason: SDUPTHER

## 2021-11-11 ENCOUNTER — LAB VISIT (OUTPATIENT)
Dept: LAB | Facility: HOSPITAL | Age: 26
End: 2021-11-11
Attending: INTERNAL MEDICINE
Payer: COMMERCIAL

## 2021-11-11 ENCOUNTER — OFFICE VISIT (OUTPATIENT)
Dept: ENDOCRINOLOGY | Facility: CLINIC | Age: 26
End: 2021-11-11
Payer: COMMERCIAL

## 2021-11-11 VITALS
SYSTOLIC BLOOD PRESSURE: 122 MMHG | HEIGHT: 70 IN | HEART RATE: 71 BPM | BODY MASS INDEX: 24.95 KG/M2 | OXYGEN SATURATION: 98 % | DIASTOLIC BLOOD PRESSURE: 80 MMHG | WEIGHT: 174.25 LBS

## 2021-11-11 DIAGNOSIS — E10.9 TYPE 1 DIABETES MELLITUS WITHOUT COMPLICATION: ICD-10-CM

## 2021-11-11 DIAGNOSIS — R79.89 ELEVATED LFTS: ICD-10-CM

## 2021-11-11 DIAGNOSIS — I10 HYPERTENSION, UNSPECIFIED TYPE: ICD-10-CM

## 2021-11-11 DIAGNOSIS — E78.5 HYPERLIPIDEMIA, UNSPECIFIED HYPERLIPIDEMIA TYPE: ICD-10-CM

## 2021-11-11 DIAGNOSIS — Z96.41 INSULIN PUMP STATUS: ICD-10-CM

## 2021-11-11 LAB
ALBUMIN SERPL BCP-MCNC: 4.7 G/DL (ref 3.5–5.2)
ALP SERPL-CCNC: 85 U/L (ref 55–135)
ALT SERPL W/O P-5'-P-CCNC: 55 U/L (ref 10–44)
AST SERPL-CCNC: 31 U/L (ref 10–40)
BILIRUB DIRECT SERPL-MCNC: 0.4 MG/DL (ref 0.1–0.3)
BILIRUB SERPL-MCNC: 1.1 MG/DL (ref 0.1–1)
ESTIMATED AVG GLUCOSE: 134 MG/DL (ref 68–131)
HBA1C MFR BLD: 6.3 % (ref 4–5.6)
PROT SERPL-MCNC: 7.6 G/DL (ref 6–8.4)

## 2021-11-11 PROCEDURE — 99999 PR PBB SHADOW E&M-EST. PATIENT-LVL IV: CPT | Mod: PBBFAC,,,

## 2021-11-11 PROCEDURE — 83036 HEMOGLOBIN GLYCOSYLATED A1C: CPT | Performed by: STUDENT IN AN ORGANIZED HEALTH CARE EDUCATION/TRAINING PROGRAM

## 2021-11-11 PROCEDURE — 99214 OFFICE O/P EST MOD 30 MIN: CPT | Mod: 25,S$GLB,, | Performed by: INTERNAL MEDICINE

## 2021-11-11 PROCEDURE — 81405 MOPATH PROCEDURE LEVEL 6: CPT | Performed by: STUDENT IN AN ORGANIZED HEALTH CARE EDUCATION/TRAINING PROGRAM

## 2021-11-11 PROCEDURE — 80076 HEPATIC FUNCTION PANEL: CPT | Performed by: INTERNAL MEDICINE

## 2021-11-11 PROCEDURE — 99999 PR PBB SHADOW E&M-EST. PATIENT-LVL IV: ICD-10-PCS | Mod: PBBFAC,,,

## 2021-11-11 PROCEDURE — 95251 CONT GLUC MNTR ANALYSIS I&R: CPT | Mod: S$GLB,,, | Performed by: INTERNAL MEDICINE

## 2021-11-11 PROCEDURE — 99214 PR OFFICE/OUTPT VISIT, EST, LEVL IV, 30-39 MIN: ICD-10-PCS | Mod: 25,S$GLB,, | Performed by: INTERNAL MEDICINE

## 2021-11-11 PROCEDURE — 36415 COLL VENOUS BLD VENIPUNCTURE: CPT | Performed by: STUDENT IN AN ORGANIZED HEALTH CARE EDUCATION/TRAINING PROGRAM

## 2021-11-11 PROCEDURE — 95251 PR GLUCOSE MONITOR, 72 HOUR, PHYS INTERP: ICD-10-PCS | Mod: S$GLB,,, | Performed by: INTERNAL MEDICINE

## 2021-11-11 RX ORDER — LOSARTAN POTASSIUM 25 MG/1
25 TABLET ORAL DAILY
Qty: 30 TABLET | Refills: 11 | Status: CANCELLED | OUTPATIENT
Start: 2021-11-11 | End: 2022-11-11

## 2021-11-11 RX ORDER — ROSUVASTATIN CALCIUM 5 MG/1
5 TABLET, COATED ORAL DAILY
Qty: 90 TABLET | Refills: 3 | Status: CANCELLED | OUTPATIENT
Start: 2021-11-11 | End: 2022-11-11

## 2021-12-21 LAB — MATURITY ONSET DIABETES OF THE YOUNG (MODY): NORMAL

## 2022-03-25 ENCOUNTER — OFFICE VISIT (OUTPATIENT)
Dept: ENDOCRINOLOGY | Facility: CLINIC | Age: 27
End: 2022-03-25
Payer: COMMERCIAL

## 2022-03-25 VITALS
HEIGHT: 70 IN | OXYGEN SATURATION: 98 % | SYSTOLIC BLOOD PRESSURE: 120 MMHG | DIASTOLIC BLOOD PRESSURE: 80 MMHG | HEART RATE: 73 BPM | BODY MASS INDEX: 24.11 KG/M2 | WEIGHT: 168.44 LBS

## 2022-03-25 DIAGNOSIS — E10.9 TYPE 1 DIABETES MELLITUS WITHOUT COMPLICATION: Primary | ICD-10-CM

## 2022-03-25 DIAGNOSIS — Z96.41 INSULIN PUMP STATUS: ICD-10-CM

## 2022-03-25 PROCEDURE — 1160F RVW MEDS BY RX/DR IN RCRD: CPT | Mod: CPTII,S$GLB,, | Performed by: INTERNAL MEDICINE

## 2022-03-25 PROCEDURE — 95251 PR GLUCOSE MONITOR, 72 HOUR, PHYS INTERP: ICD-10-PCS | Mod: S$GLB,,, | Performed by: INTERNAL MEDICINE

## 2022-03-25 PROCEDURE — 3072F LOW RISK FOR RETINOPATHY: CPT | Mod: CPTII,S$GLB,, | Performed by: INTERNAL MEDICINE

## 2022-03-25 PROCEDURE — 99214 PR OFFICE/OUTPT VISIT, EST, LEVL IV, 30-39 MIN: ICD-10-PCS | Mod: 25,S$GLB,, | Performed by: INTERNAL MEDICINE

## 2022-03-25 PROCEDURE — 1159F MED LIST DOCD IN RCRD: CPT | Mod: CPTII,S$GLB,, | Performed by: INTERNAL MEDICINE

## 2022-03-25 PROCEDURE — 3008F BODY MASS INDEX DOCD: CPT | Mod: CPTII,S$GLB,, | Performed by: INTERNAL MEDICINE

## 2022-03-25 PROCEDURE — 3074F PR MOST RECENT SYSTOLIC BLOOD PRESSURE < 130 MM HG: ICD-10-PCS | Mod: CPTII,S$GLB,, | Performed by: INTERNAL MEDICINE

## 2022-03-25 PROCEDURE — 99999 PR PBB SHADOW E&M-EST. PATIENT-LVL III: CPT | Mod: PBBFAC,,, | Performed by: INTERNAL MEDICINE

## 2022-03-25 PROCEDURE — 95251 CONT GLUC MNTR ANALYSIS I&R: CPT | Mod: S$GLB,,, | Performed by: INTERNAL MEDICINE

## 2022-03-25 PROCEDURE — 3079F DIAST BP 80-89 MM HG: CPT | Mod: CPTII,S$GLB,, | Performed by: INTERNAL MEDICINE

## 2022-03-25 PROCEDURE — 3008F PR BODY MASS INDEX (BMI) DOCUMENTED: ICD-10-PCS | Mod: CPTII,S$GLB,, | Performed by: INTERNAL MEDICINE

## 2022-03-25 PROCEDURE — 1159F PR MEDICATION LIST DOCUMENTED IN MEDICAL RECORD: ICD-10-PCS | Mod: CPTII,S$GLB,, | Performed by: INTERNAL MEDICINE

## 2022-03-25 PROCEDURE — 3072F PR LOW RISK FOR RETINOPATHY: ICD-10-PCS | Mod: CPTII,S$GLB,, | Performed by: INTERNAL MEDICINE

## 2022-03-25 PROCEDURE — 1160F PR REVIEW ALL MEDS BY PRESCRIBER/CLIN PHARMACIST DOCUMENTED: ICD-10-PCS | Mod: CPTII,S$GLB,, | Performed by: INTERNAL MEDICINE

## 2022-03-25 PROCEDURE — 3079F PR MOST RECENT DIASTOLIC BLOOD PRESSURE 80-89 MM HG: ICD-10-PCS | Mod: CPTII,S$GLB,, | Performed by: INTERNAL MEDICINE

## 2022-03-25 PROCEDURE — 99214 OFFICE O/P EST MOD 30 MIN: CPT | Mod: 25,S$GLB,, | Performed by: INTERNAL MEDICINE

## 2022-03-25 PROCEDURE — 3074F SYST BP LT 130 MM HG: CPT | Mod: CPTII,S$GLB,, | Performed by: INTERNAL MEDICINE

## 2022-03-25 PROCEDURE — 99999 PR PBB SHADOW E&M-EST. PATIENT-LVL III: ICD-10-PCS | Mod: PBBFAC,,, | Performed by: INTERNAL MEDICINE

## 2022-03-25 NOTE — ASSESSMENT & PLAN NOTE
Pump and sensor download reviewed and significant for hyperglycemia after dinner as well as after lunch on days when not working  Work days look great  We discussed having two profiles but he is concerned about remembering to change back and forth. Will instead keep one profile and can put in increased number of carbs on weekends when less active    New pump settings:  12 AM Basal 1.2 ISF 1:35 CR 1:9 Target 120  6 AM Basal 1.6 ISF 1:35 CR 1:9 Target 120  6 PM Basal 1.8  ISF 1:35 CR 1:8 Target 120    Patient Instructions   Change 6 PM segment to start at 4:30. Make carb ratio 1:8. If you are still high after dinner, can make it 1:7    On weekend days, add 15-20% extra carbs to meal estimates    Schedule eye visit next few months    Check to see if labs cheaper with [facility] and let me know where/when you want to do them    Will do trial of Fiasp. Discussed possible occlusions and he will look out for this    Has basal insulin. He and wife and looking into glucagon

## 2022-03-25 NOTE — PATIENT INSTRUCTIONS
Change 6 PM segment to start at 4:30. Make carb ratio 1:8. If you are still high after dinner, can make it 1:7    On weekend days, add 15-20% extra carbs to meal estimates    Schedule eye visit next few months    Check to see if labs cheaper with [facility] and let me know where/when you want to do them

## 2022-03-25 NOTE — PROGRESS NOTES
Yamilka Azul is a 26 y.o. male presenting for follow-up of T1DM      History of Present Illness  T1DM  Diagnosed at age 15  Known complications: denies    Strong family history of type 1 diabetes: grandmother, maternal uncle, cousin's child, mother and sister with T1DM. We have done FREYA testing which was negative    Has been on pump therapy since time of diagnosis  Using Tandem TSlim with controlIQ    Usually does manual boluses for meals. Inputs sugar for correction  Knows he is basal heavy. Eats low carb during the day and then mostly boluses for dinner and correction  Does not work Fri-Sunday and glucoses tend to be higher on those days    After last visit he found sugars staying high so made correction stronger and this helped  Working on timing of boluses with meals      Current Diabetes Regimen:  Tandem TSlim with Control IQ- novolog    Pump settings:  12 AM Basal 1.2 ISF 1:35 CR 1:9 Target 120  6 AM Basal 1.6 ISF 1:35 CR 1:9 Target 120  6 PM Basal 1.8  ISF 1:35 CR 1:9 Target 120    Diet/Exercise:  Coffee in morning- drinks slowly over few hours  Small protein yogurt midday  Salads for lunch  Maybe some snack in afternoon if sugar going down  Dinner main meal    Bigger meals on weekends      Last Hgb A1C:  Lab Results   Component Value Date    HGBA1C 6.3 (H) 11/11/2021        Glucose Monitoring:  Meter/CGM: Dexcom G6  Reviewed and scanned into media        Hypoglycemic Episodes:see above, rare    DKA: never    Screening / DM Complications:    Nephropathy:  ACEi/ARB: Not taking  No results found for: MICALBCREAT    Last Lipid Panel:  Statin: Not taking  Lab Results   Component Value Date    LDLCALC 101.2 02/26/2021       Last foot exam : 11/11/2021  Last eye exam : 05/14/2021;  no laser surgery or DR    Lab Results   Component Value Date    TSH 0.532 02/26/2021       No results found for: TTGIGA    Recently has noticed night sweats.  Denies significant weight change, tremor, palpitations      Current  Outpatient Medications:     albuterol (PROVENTIL/VENTOLIN HFA) 90 mcg/actuation inhaler, Inhale 2 puffs into the lungs every 4 (four) hours as needed for Wheezing. Rescue, Disp: 6.7 g, Rfl: 0    blood-glucose sensor (DEXCOM G6 SENSOR) Nette, 3 Devices by Misc.(Non-Drug; Combo Route) route every 30 days., Disp: 3 Device, Rfl: 11    blood-glucose transmitter (DEXCOM G6 TRANSMITTER) Nette, 2 Devices by Misc.(Non-Drug; Combo Route) route every 6 (six) months., Disp: 2 Device, Rfl: 1    glucagon (BAQSIMI) 3 mg/actuation Spry, 1 each by Nasal route as needed (hypoglycemia)., Disp: 2 each, Rfl: 3    insulin (BASAGLAR KWIKPEN U-100 INSULIN) glargine 100 units/mL (3mL) SubQ pen, Take 40 units daily in instance of pump failure, Disp: 3 Syringe, Rfl: 1    insulin aspart (NOVOLOG FLEXPEN U-100 INSULIN SUBQ), Inject into the skin. Inject up to 60 units daily via insulin pump, Disp: , Rfl:     ROS as above    Objective:     Vitals:    03/25/22 1428   BP: 120/80   Pulse: 73     Wt Readings from Last 3 Encounters:   03/25/22 76.4 kg (168 lb 6.9 oz)   11/11/21 79.1 kg (174 lb 4.4 oz)   08/20/21 76.2 kg (168 lb)     Body mass index is 24.17 kg/m².  Physical Exam  Constitutional:       Appearance: He is well-developed.   HENT:      Head: Normocephalic.   Eyes:      Conjunctiva/sclera: Conjunctivae normal.   Neck:      Comments: No thyromegaly  Pulmonary:      Effort: Pulmonary effort is normal.   Musculoskeletal:         General: Normal range of motion.   Skin:     General: Skin is warm.      Findings: No rash.   Neurological:      Mental Status: He is alert and oriented to person, place, and time.         Labs    Chemistry        Component Value Date/Time     07/23/2021 0806    K 4.2 07/23/2021 0806     07/23/2021 0806    CO2 26 07/23/2021 0806    BUN 13 07/23/2021 0806    CREATININE 0.9 07/23/2021 0806     (H) 07/23/2021 0806        Component Value Date/Time    CALCIUM 10.0 07/23/2021 0806    ALKPHOS 85  11/11/2021 1621    AST 31 11/11/2021 1621    ALT 55 (H) 11/11/2021 1621    BILITOT 1.1 (H) 11/11/2021 1621    ESTGFRAFRICA >60.0 07/23/2021 0806    EGFRNONAA >60.0 07/23/2021 0806              Assessment and Plan     Type 1 diabetes  Pump and sensor download reviewed and significant for hyperglycemia after dinner as well as after lunch on days when not working  Work days look great  We discussed having two profiles but he is concerned about remembering to change back and forth. Will instead keep one profile and can put in increased number of carbs on weekends when less active    New pump settings:  12 AM Basal 1.2 ISF 1:35 CR 1:9 Target 120  6 AM Basal 1.6 ISF 1:35 CR 1:9 Target 120  6 PM Basal 1.8  ISF 1:35 CR 1:8 Target 120    Patient Instructions   Change 6 PM segment to start at 4:30. Make carb ratio 1:8. If you are still high after dinner, can make it 1:7    On weekend days, add 15-20% extra carbs to meal estimates    Schedule eye visit next few months    Check to see if labs cheaper with [facility] and let me know where/when you want to do them    Will do trial of Fiasp. Discussed possible occlusions and he will look out for this    Has basal insulin. He and wife and looking into glucagon      Insulin pump status  See above        Face to Face time with patient: 20 min  35 minutes of total time spent on the encounter, which includes face to face time and non-face to face time preparing to see the patient (eg, review of tests), Obtaining and/or reviewing separately obtained history, Documenting clinical information in the electronic or other health record, Independently interpreting results (not separately reported) and communicating results to the patient/family/caregiver, or Care coordination (not separately reported).   Additional time spent reviewing CGM data      RTC 6 months    Catherine Menezes MD

## 2022-04-09 ENCOUNTER — HISTORICAL (OUTPATIENT)
Dept: ADMINISTRATIVE | Facility: HOSPITAL | Age: 27
End: 2022-04-09
Payer: COMMERCIAL

## 2022-04-25 VITALS
DIASTOLIC BLOOD PRESSURE: 72 MMHG | BODY MASS INDEX: 24.43 KG/M2 | WEIGHT: 170.63 LBS | SYSTOLIC BLOOD PRESSURE: 120 MMHG | HEIGHT: 70 IN

## 2022-05-03 ENCOUNTER — PATIENT MESSAGE (OUTPATIENT)
Dept: RESEARCH | Facility: HOSPITAL | Age: 27
End: 2022-05-03
Payer: COMMERCIAL

## 2022-05-16 ENCOUNTER — PATIENT MESSAGE (OUTPATIENT)
Dept: ENDOCRINOLOGY | Facility: CLINIC | Age: 27
End: 2022-05-16
Payer: COMMERCIAL

## 2022-05-16 DIAGNOSIS — E03.9 HYPOTHYROIDISM, UNSPECIFIED TYPE: ICD-10-CM

## 2022-05-16 DIAGNOSIS — E10.9 TYPE 1 DIABETES MELLITUS WITHOUT COMPLICATION: Primary | ICD-10-CM

## 2022-05-18 ENCOUNTER — PATIENT MESSAGE (OUTPATIENT)
Dept: OPTOMETRY | Facility: CLINIC | Age: 27
End: 2022-05-18
Payer: COMMERCIAL

## 2022-06-13 RX ORDER — BLOOD-GLUCOSE TRANSMITTER
1 EACH MISCELLANEOUS
Qty: 1 EACH | Refills: 3 | Status: SHIPPED | OUTPATIENT
Start: 2022-06-13 | End: 2023-03-30

## 2022-09-08 DIAGNOSIS — E10.9 TYPE 1 DIABETES MELLITUS WITHOUT COMPLICATION: Primary | ICD-10-CM

## 2022-09-08 RX ORDER — INSULIN ASPART 100 [IU]/ML
INJECTION, SOLUTION INTRAVENOUS; SUBCUTANEOUS
Qty: 70 ML | Refills: 3 | Status: SHIPPED | OUTPATIENT
Start: 2022-09-08 | End: 2022-12-13 | Stop reason: SDUPTHER

## 2022-09-23 ENCOUNTER — OFFICE VISIT (OUTPATIENT)
Dept: UROLOGY | Facility: CLINIC | Age: 27
End: 2022-09-23
Payer: COMMERCIAL

## 2022-09-23 VITALS — DIASTOLIC BLOOD PRESSURE: 71 MMHG | HEART RATE: 75 BPM | SYSTOLIC BLOOD PRESSURE: 131 MMHG

## 2022-09-23 DIAGNOSIS — I86.1 LEFT VARICOCELE: Primary | ICD-10-CM

## 2022-09-23 PROCEDURE — 1159F PR MEDICATION LIST DOCUMENTED IN MEDICAL RECORD: ICD-10-PCS | Mod: CPTII,S$GLB,, | Performed by: NURSE PRACTITIONER

## 2022-09-23 PROCEDURE — 1160F RVW MEDS BY RX/DR IN RCRD: CPT | Mod: CPTII,S$GLB,, | Performed by: NURSE PRACTITIONER

## 2022-09-23 PROCEDURE — 3075F SYST BP GE 130 - 139MM HG: CPT | Mod: CPTII,S$GLB,, | Performed by: NURSE PRACTITIONER

## 2022-09-23 PROCEDURE — 3078F PR MOST RECENT DIASTOLIC BLOOD PRESSURE < 80 MM HG: ICD-10-PCS | Mod: CPTII,S$GLB,, | Performed by: NURSE PRACTITIONER

## 2022-09-23 PROCEDURE — 99213 PR OFFICE/OUTPT VISIT, EST, LEVL III, 20-29 MIN: ICD-10-PCS | Mod: S$GLB,,, | Performed by: NURSE PRACTITIONER

## 2022-09-23 PROCEDURE — 3075F PR MOST RECENT SYSTOLIC BLOOD PRESS GE 130-139MM HG: ICD-10-PCS | Mod: CPTII,S$GLB,, | Performed by: NURSE PRACTITIONER

## 2022-09-23 PROCEDURE — 99213 OFFICE O/P EST LOW 20 MIN: CPT | Mod: S$GLB,,, | Performed by: NURSE PRACTITIONER

## 2022-09-23 PROCEDURE — 1159F MED LIST DOCD IN RCRD: CPT | Mod: CPTII,S$GLB,, | Performed by: NURSE PRACTITIONER

## 2022-09-23 PROCEDURE — 3078F DIAST BP <80 MM HG: CPT | Mod: CPTII,S$GLB,, | Performed by: NURSE PRACTITIONER

## 2022-09-23 PROCEDURE — 3072F PR LOW RISK FOR RETINOPATHY: ICD-10-PCS | Mod: CPTII,S$GLB,, | Performed by: NURSE PRACTITIONER

## 2022-09-23 PROCEDURE — 3072F LOW RISK FOR RETINOPATHY: CPT | Mod: CPTII,S$GLB,, | Performed by: NURSE PRACTITIONER

## 2022-09-23 PROCEDURE — 1160F PR REVIEW ALL MEDS BY PRESCRIBER/CLIN PHARMACIST DOCUMENTED: ICD-10-PCS | Mod: CPTII,S$GLB,, | Performed by: NURSE PRACTITIONER

## 2022-09-23 NOTE — PROGRESS NOTES
"Subjective:      Yamilka Azul is a 27 y.o. male who was self-referred for evaluation of for his abnormal testicular self exam.      The patient presents today reporting a "lump near his left testicle" that he first noticed several years ago. Fluctuates somewhat in size, painless. Denies penile discharge and potential STD exposure. Denies bothersome urinary symptoms.    Denies a family history of testicular cancer.    The following portions of the patient's history were reviewed and updated as appropriate: allergies, current medications, past family history, past medical history, past social history, past surgical history and problem list.    Review of Systems  Constitutional: no fever or chills  ENT: no nasal congestion or sore throat  Respiratory: no cough or shortness of breath  Cardiovascular: no chest pain or palpitations  Gastrointestinal: no nausea or vomiting, tolerating diet  Genitourinary: as per HPI  Hematologic/Lymphatic: no easy bruising or lymphadenopathy  Musculoskeletal: no arthralgias or myalgias  Neurological: no seizures or tremors  Behavioral/Psych: no auditory or visual hallucinations     Objective:   Vitals:   Vitals:    09/23/22 1437   BP: 131/71   Pulse: 75     Physical Exam   General: alert and oriented, no acute distress  Head: normocephalic, atraumatic  Neck: supple, normal ROM  Respiratory: Symmetric expansion, non-labored breathing  Cardiovascular: regular rate and rhythm  Abdomen: soft, non tender, non distended  Genitourinary:   Penis: normal, no lesions, patent orthotopic meatus, no plaques  Scrotum: varicocele: left, varicocele grade: 3, no rashes or skin changes;   Testes: descended bilaterally, no masses, nontender, normal epididymides bilaterally, no hydroceles  Skin: normal coloration and turgor, no rashes, no suspicious skin lesions noted  Neuro: alert and oriented x3, no gross deficits  Psych: normal judgment and insight, normal mood/affect, and non-anxious    Lab Review "   Urinalysis demonstrates : no sample   No results found for: WBC, HGB, HCT, MCV, PLT  Lab Results   Component Value Date    CREATININE 0.9 07/23/2021    BUN 13 07/23/2021     No results found for: PSA  Imaging  None    Assessment:   Left varicocele     Plan:   Diagnoses and all orders for this visit:    Left varicocele  -     US Scrotum And Testicles; Future    Plan:  --Scrotal US for confirmation   --Discussed the benign nature of a left varicocele   --Discussed conservative measures for relieving testicular pain - scrotal support, ibuprofen, scrotal elevation, ice packs

## 2022-10-21 ENCOUNTER — HOSPITAL ENCOUNTER (OUTPATIENT)
Dept: RADIOLOGY | Facility: OTHER | Age: 27
Discharge: HOME | End: 2022-10-21
Attending: NURSE PRACTITIONER
Payer: COMMERCIAL

## 2022-10-21 DIAGNOSIS — I86.1 LEFT VARICOCELE: ICD-10-CM

## 2022-10-21 PROCEDURE — 76870 US EXAM SCROTUM: CPT | Mod: TC

## 2022-10-21 PROCEDURE — 76870 US SCROTUM AND TESTICLES: ICD-10-PCS | Mod: 26,,, | Performed by: RADIOLOGY

## 2022-10-21 PROCEDURE — 76870 US EXAM SCROTUM: CPT | Mod: 26,,, | Performed by: RADIOLOGY

## 2022-12-12 ENCOUNTER — PATIENT MESSAGE (OUTPATIENT)
Dept: ENDOCRINOLOGY | Facility: CLINIC | Age: 27
End: 2022-12-12
Payer: COMMERCIAL

## 2022-12-12 DIAGNOSIS — E10.9 TYPE 1 DIABETES MELLITUS WITHOUT COMPLICATION: ICD-10-CM

## 2022-12-13 RX ORDER — INSULIN ASPART 100 [IU]/ML
INJECTION, SOLUTION INTRAVENOUS; SUBCUTANEOUS
Qty: 70 ML | Refills: 3 | Status: SHIPPED | OUTPATIENT
Start: 2022-12-13 | End: 2023-01-18

## 2022-12-19 ENCOUNTER — OFFICE VISIT (OUTPATIENT)
Dept: URGENT CARE | Facility: CLINIC | Age: 27
End: 2022-12-19
Payer: COMMERCIAL

## 2022-12-19 VITALS
HEIGHT: 62 IN | RESPIRATION RATE: 18 BRPM | SYSTOLIC BLOOD PRESSURE: 130 MMHG | DIASTOLIC BLOOD PRESSURE: 80 MMHG | WEIGHT: 170 LBS | TEMPERATURE: 97 F | HEART RATE: 81 BPM | OXYGEN SATURATION: 98 % | BODY MASS INDEX: 31.28 KG/M2

## 2022-12-19 DIAGNOSIS — E10.9 TYPE 1 DIABETES MELLITUS WITHOUT COMPLICATION: ICD-10-CM

## 2022-12-19 DIAGNOSIS — R07.82 INTERCOSTAL PAIN: Primary | ICD-10-CM

## 2022-12-19 PROCEDURE — 3079F PR MOST RECENT DIASTOLIC BLOOD PRESSURE 80-89 MM HG: ICD-10-PCS | Mod: CPTII,S$GLB,, | Performed by: FAMILY MEDICINE

## 2022-12-19 PROCEDURE — 3075F PR MOST RECENT SYSTOLIC BLOOD PRESS GE 130-139MM HG: ICD-10-PCS | Mod: CPTII,S$GLB,, | Performed by: FAMILY MEDICINE

## 2022-12-19 PROCEDURE — 99214 PR OFFICE/OUTPT VISIT, EST, LEVL IV, 30-39 MIN: ICD-10-PCS | Mod: S$GLB,,, | Performed by: FAMILY MEDICINE

## 2022-12-19 PROCEDURE — 93010 ELECTROCARDIOGRAM REPORT: CPT | Mod: S$GLB,,, | Performed by: INTERNAL MEDICINE

## 2022-12-19 PROCEDURE — 93005 ELECTROCARDIOGRAM TRACING: CPT | Mod: S$GLB,,, | Performed by: FAMILY MEDICINE

## 2022-12-19 PROCEDURE — 3008F PR BODY MASS INDEX (BMI) DOCUMENTED: ICD-10-PCS | Mod: CPTII,S$GLB,, | Performed by: FAMILY MEDICINE

## 2022-12-19 PROCEDURE — 93010 EKG 12-LEAD: ICD-10-PCS | Mod: S$GLB,,, | Performed by: INTERNAL MEDICINE

## 2022-12-19 PROCEDURE — 3075F SYST BP GE 130 - 139MM HG: CPT | Mod: CPTII,S$GLB,, | Performed by: FAMILY MEDICINE

## 2022-12-19 PROCEDURE — 1160F PR REVIEW ALL MEDS BY PRESCRIBER/CLIN PHARMACIST DOCUMENTED: ICD-10-PCS | Mod: CPTII,S$GLB,, | Performed by: FAMILY MEDICINE

## 2022-12-19 PROCEDURE — 3008F BODY MASS INDEX DOCD: CPT | Mod: CPTII,S$GLB,, | Performed by: FAMILY MEDICINE

## 2022-12-19 PROCEDURE — 71046 XR CHEST PA AND LATERAL: ICD-10-PCS | Mod: S$GLB,,, | Performed by: RADIOLOGY

## 2022-12-19 PROCEDURE — 1159F PR MEDICATION LIST DOCUMENTED IN MEDICAL RECORD: ICD-10-PCS | Mod: CPTII,S$GLB,, | Performed by: FAMILY MEDICINE

## 2022-12-19 PROCEDURE — 3072F PR LOW RISK FOR RETINOPATHY: ICD-10-PCS | Mod: CPTII,S$GLB,, | Performed by: FAMILY MEDICINE

## 2022-12-19 PROCEDURE — 99214 OFFICE O/P EST MOD 30 MIN: CPT | Mod: S$GLB,,, | Performed by: FAMILY MEDICINE

## 2022-12-19 PROCEDURE — 93005 EKG 12-LEAD: ICD-10-PCS | Mod: S$GLB,,, | Performed by: FAMILY MEDICINE

## 2022-12-19 PROCEDURE — 1159F MED LIST DOCD IN RCRD: CPT | Mod: CPTII,S$GLB,, | Performed by: FAMILY MEDICINE

## 2022-12-19 PROCEDURE — 1160F RVW MEDS BY RX/DR IN RCRD: CPT | Mod: CPTII,S$GLB,, | Performed by: FAMILY MEDICINE

## 2022-12-19 PROCEDURE — 3079F DIAST BP 80-89 MM HG: CPT | Mod: CPTII,S$GLB,, | Performed by: FAMILY MEDICINE

## 2022-12-19 PROCEDURE — 3072F LOW RISK FOR RETINOPATHY: CPT | Mod: CPTII,S$GLB,, | Performed by: FAMILY MEDICINE

## 2022-12-19 PROCEDURE — 71046 X-RAY EXAM CHEST 2 VIEWS: CPT | Mod: S$GLB,,, | Performed by: RADIOLOGY

## 2022-12-19 NOTE — PROGRESS NOTES
"Subjective:       Patient ID: Yamilka Azul is a 27 y.o. male.    Vitals:  height is 5' 2" (1.575 m) and weight is 77.1 kg (170 lb). His temperature is 96.7 °F (35.9 °C). His blood pressure is 130/80 and his pulse is 81. His respiration is 18 and oxygen saturation is 98%.     Chief Complaint: Chest Pain    Patient has left rib pain off and on since last night.  The CP is not associated with exertion or SOB, denies cough or diaphoresis- denies recent injury or coughing.  He also had some left forearm pain a few days ago but that has resolved the current CP is not radiating to neck or arm.     Chest Pain   This is a new problem. The current episode started in the past 7 days. The problem has been gradually worsening. The pain is at a severity of 5/10. The quality of the pain is described as dull. The pain is aggravated by nothing. He has tried nothing for the symptoms.     Cardiovascular:  Positive for chest pain.     Objective:      Physical Exam   Constitutional: He is oriented to person, place, and time. He appears well-developed. He is cooperative.  Non-toxic appearance. He does not appear ill. No distress.   HENT:   Head: Normocephalic and atraumatic.   Ears:   Right Ear: Hearing normal.   Left Ear: Hearing normal.   Nose: Nose normal. No mucosal edema, rhinorrhea or nasal deformity. No epistaxis. Right sinus exhibits no maxillary sinus tenderness and no frontal sinus tenderness. Left sinus exhibits no maxillary sinus tenderness and no frontal sinus tenderness.   Mouth/Throat: Uvula is midline, oropharynx is clear and moist and mucous membranes are normal. Mucous membranes are moist. No trismus in the jaw. Normal dentition. No uvula swelling. No posterior oropharyngeal erythema.   Eyes: Conjunctivae and lids are normal. Right eye exhibits no discharge. Left eye exhibits no discharge. No scleral icterus.   Neck: Trachea normal and phonation normal. Neck supple.   Cardiovascular: Normal rate, regular rhythm, " normal heart sounds and normal pulses.   Pulmonary/Chest: Effort normal and breath sounds normal. No respiratory distress. He exhibits tenderness (mild left lower anterior chest wall tenderness).   Abdominal: Normal appearance and bowel sounds are normal. He exhibits no distension and no mass. Soft. There is no abdominal tenderness.   Musculoskeletal: Normal range of motion.         General: No deformity. Normal range of motion.   Neurological: He is alert and oriented to person, place, and time. He exhibits normal muscle tone. Coordination normal.   Skin: Skin is warm, dry, intact, not diaphoretic and not pale.   Psychiatric: His speech is normal and behavior is normal. Judgment and thought content normal.   Nursing note and vitals reviewed.      Assessment:       1. Intercostal pain    2. Type 1 diabetes mellitus without complication          Plan:     EKG -NSR, ST elevation   Chest xray- The preliminary reading of your xray showed no acute findings. We will call you if the final read is different than what we discussed.     Intercostal pain  -     IN OFFICE EKG 12-LEAD (to [place])  -     Ambulatory referral/consult to Cardiology  -     X-Ray Chest PA And Lateral; Future; Expected date: 12/19/2022    Type 1 diabetes mellitus without complication    Pt or guardian provided educational materials and instructions regarding their visit diagnosis.       I have a low index of suspicion for a cardiac origin but will refer to cardiology due to minor ekg abnormality. If he develops worsening symptoms in the meantime then proceed to ed

## 2023-01-14 ENCOUNTER — OFFICE VISIT (OUTPATIENT)
Dept: URGENT CARE | Facility: CLINIC | Age: 28
End: 2023-01-14
Payer: COMMERCIAL

## 2023-01-14 VITALS
HEART RATE: 74 BPM | BODY MASS INDEX: 31.28 KG/M2 | RESPIRATION RATE: 20 BRPM | HEIGHT: 62 IN | WEIGHT: 170 LBS | DIASTOLIC BLOOD PRESSURE: 80 MMHG | TEMPERATURE: 99 F | OXYGEN SATURATION: 98 % | SYSTOLIC BLOOD PRESSURE: 132 MMHG

## 2023-01-14 DIAGNOSIS — J02.9 SORE THROAT: ICD-10-CM

## 2023-01-14 DIAGNOSIS — J02.0 STREP PHARYNGITIS: Primary | ICD-10-CM

## 2023-01-14 LAB
CTP QC/QA: YES
MOLECULAR STREP A: POSITIVE

## 2023-01-14 PROCEDURE — 3079F DIAST BP 80-89 MM HG: CPT | Mod: CPTII,S$GLB,,

## 2023-01-14 PROCEDURE — 3008F BODY MASS INDEX DOCD: CPT | Mod: CPTII,S$GLB,,

## 2023-01-14 PROCEDURE — 87651 STREP A DNA AMP PROBE: CPT | Mod: QW,S$GLB,,

## 2023-01-14 PROCEDURE — 3079F PR MOST RECENT DIASTOLIC BLOOD PRESSURE 80-89 MM HG: ICD-10-PCS | Mod: CPTII,S$GLB,,

## 2023-01-14 PROCEDURE — 3008F PR BODY MASS INDEX (BMI) DOCUMENTED: ICD-10-PCS | Mod: CPTII,S$GLB,,

## 2023-01-14 PROCEDURE — 3075F SYST BP GE 130 - 139MM HG: CPT | Mod: CPTII,S$GLB,,

## 2023-01-14 PROCEDURE — 1160F PR REVIEW ALL MEDS BY PRESCRIBER/CLIN PHARMACIST DOCUMENTED: ICD-10-PCS | Mod: CPTII,S$GLB,,

## 2023-01-14 PROCEDURE — 1160F RVW MEDS BY RX/DR IN RCRD: CPT | Mod: CPTII,S$GLB,,

## 2023-01-14 PROCEDURE — 87651 POCT STREP A MOLECULAR: ICD-10-PCS | Mod: QW,S$GLB,,

## 2023-01-14 PROCEDURE — 99213 OFFICE O/P EST LOW 20 MIN: CPT | Mod: S$GLB,,,

## 2023-01-14 PROCEDURE — 1159F MED LIST DOCD IN RCRD: CPT | Mod: CPTII,S$GLB,,

## 2023-01-14 PROCEDURE — 1159F PR MEDICATION LIST DOCUMENTED IN MEDICAL RECORD: ICD-10-PCS | Mod: CPTII,S$GLB,,

## 2023-01-14 PROCEDURE — 3075F PR MOST RECENT SYSTOLIC BLOOD PRESS GE 130-139MM HG: ICD-10-PCS | Mod: CPTII,S$GLB,,

## 2023-01-14 PROCEDURE — 99213 PR OFFICE/OUTPT VISIT, EST, LEVL III, 20-29 MIN: ICD-10-PCS | Mod: S$GLB,,,

## 2023-01-14 RX ORDER — AZITHROMYCIN 250 MG/1
TABLET, FILM COATED ORAL
Qty: 6 TABLET | Refills: 0 | Status: SHIPPED | OUTPATIENT
Start: 2023-01-14 | End: 2023-01-19

## 2023-01-14 NOTE — PATIENT INSTRUCTIONS
You have tested positive for streptococcus pharyngitis today. Make sure to take antibiotics until completed!!  - You can take over the counter ibuprofen or tylenol for throat pain. You can also do warm salt water gargles to help soothe the throat.  - You are no longer considered contagious once you have had antibiotics in your system for 24 hours.   - Make sure to change out your tooth brush once you have been on antibiotics for over 24 hours to prevent reinfecting yourself. Wash any pillow cases you have slept on or masks that you have worn while infected.  - Do not eat or drink after anyone to prevent infecting someone else with strep throat.     - Rest.    - Drink plenty of fluids.    - Acetaminophen (tylenol) or Ibuprofen (advil,motrin) as directed as needed for fever/pain. Avoid tylenol if you have a history of liver disease. Do not take ibuprofen if you have a history of GI bleeding, kidney disease, or if you take blood thinners.   - Ibuprofen dosing for adults: 400 mg by mouth every 4-6 hours as needed. Max: 2400 mg/day; Info: use lowest effective dose, shortest effective treatment duration; give w/ food if GI upset occurs.  - Tylenol dosing for adults: [By mouth route, immediate-release form] Dose: 325-1000 mg by mouth every 4-6h as needed; Max: 1 g/4h and 4 g/day from all sources. [By mouth route, extended-release form] Dose: 650-1300 mg Extended Release by mouth every 8h as needed; Max: 4 g/day from all sources.     - You must understand that you have received an Urgent Care treatment only and that you may be released before all of your medical problems are known or treated.   - You, the patient, will arrange for follow up care as instructed.   - If your condition worsens or fails to improve we recommend that you receive another evaluation at the ER immediately or contact your PCP to discuss your concerns or return here.   - Follow up with your PCP or specialty clinic as directed in the next 1-2 weeks if  not improved or as needed.  You can call [phone] to schedule an appointment with the appropriate provider.    If your symptoms do not improve or worsen, go to the emergency room immediately.

## 2023-01-14 NOTE — PROGRESS NOTES
"Subjective:       Patient ID: Yamilka Azul is a 27 y.o. male.    Vitals:  height is 5' 2" (1.575 m) and weight is 77.1 kg (170 lb). His temperature is 98.6 °F (37 °C). His blood pressure is 132/80 and his pulse is 74. His respiration is 20 and oxygen saturation is 98%.     Chief Complaint: Sore Throat (/)    Pt presents with complaint of sore throat and dry cough x2 weeks.  Pt states she has not taken anything for his symptoms.  Pt states he has no hx of strep. Pain with swelling. Denies fever, body aches or chills.     Sore Throat   This is a new problem. The current episode started 1 to 4 weeks ago. The problem has been unchanged. Neither side of throat is experiencing more pain than the other. There has been no fever. The pain is at a severity of 4/10. The pain is moderate. Associated symptoms include coughing. Pertinent negatives include no congestion, ear pain or vomiting. He has had no exposure to strep or mono. He has tried nothing for the symptoms. The treatment provided no relief.     Constitution: Negative for sweating and fatigue.   HENT:  Positive for sore throat. Negative for ear pain and congestion.    Eyes:  Negative for eye pain and eye redness.   Respiratory:  Positive for cough. Negative for sputum production.    Gastrointestinal:  Negative for nausea and vomiting.   Skin:  Negative for hives.   Allergic/Immunologic: Negative for hives and itching.   Neurological:  Negative for disorientation and altered mental status.   Psychiatric/Behavioral:  Negative for altered mental status and disorientation.      Objective:      Physical Exam   Constitutional: He is oriented to person, place, and time. He appears well-developed. He is cooperative.  Non-toxic appearance. He does not appear ill. No distress.      Comments:Patient sits comfortably in exam chair. Answers questions in complete sentences. Does not show any signs of distress or discoloration.        HENT:   Head: Normocephalic and " atraumatic.   Ears:   Right Ear: Hearing, tympanic membrane, external ear and ear canal normal. impacted cerumen  Left Ear: Hearing, tympanic membrane, external ear and ear canal normal. impacted cerumen  Nose: Nose normal. No mucosal edema, rhinorrhea, nasal deformity or congestion. No epistaxis. Right sinus exhibits no maxillary sinus tenderness and no frontal sinus tenderness. Left sinus exhibits no maxillary sinus tenderness and no frontal sinus tenderness.   Mouth/Throat: Uvula is midline and mucous membranes are normal. No trismus in the jaw. Normal dentition. No uvula swelling. Posterior oropharyngeal erythema present. No oropharyngeal exudate or posterior oropharyngeal edema.   Eyes: Conjunctivae and lids are normal. No scleral icterus.   Neck: Trachea normal and phonation normal. Neck supple. No edema present. No erythema present. No neck rigidity present.   Cardiovascular: Normal rate, regular rhythm, normal heart sounds and normal pulses.   Pulmonary/Chest: Effort normal and breath sounds normal. No stridor. No respiratory distress. He has no decreased breath sounds. He has no wheezes. He has no rhonchi. He has no rales.   Abdominal: Normal appearance.   Musculoskeletal: Normal range of motion.         General: No deformity. Normal range of motion.   Lymphadenopathy:     He has no cervical adenopathy.        Right cervical: No superficial cervical, no deep cervical and no posterior cervical adenopathy present.       Left cervical: No superficial cervical, no deep cervical and no posterior cervical adenopathy present.   Neurological: He is alert and oriented to person, place, and time. He exhibits normal muscle tone. Coordination normal.   Skin: Skin is warm, dry, intact, not diaphoretic and not pale.   Psychiatric: His speech is normal and behavior is normal. Judgment and thought content normal.   Nursing note and vitals reviewed.      Results for orders placed or performed in visit on 01/14/23   POCT  Strep A, Molecular   Result Value Ref Range    Molecular Strep A, POC Positive (A) Negative     Acceptable Yes        Assessment:       1. Strep pharyngitis    2. Sore throat          Plan:         Strep pharyngitis  -     azithromycin (Z-COLE) 250 MG tablet; Take 2 tablets by mouth on day 1; Take 1 tablet by mouth on days 2-5  Dispense: 6 tablet; Refill: 0  -     (Magic mouthwash) 1:1:1 diphenhydrAMINE(Benadryl) 12.5mg/5ml liq, aluminum & magnesium hydroxide-simethicone (Maalox), LIDOcaine viscous 2%; Swish and spit 15 mLs every 4 (four) hours as needed (sore throat). Sore throat  Dispense: 180 mL; Refill: 0    Sore throat  -     POCT Strep A, Molecular  -     (Magic mouthwash) 1:1:1 diphenhydrAMINE(Benadryl) 12.5mg/5ml liq, aluminum & magnesium hydroxide-simethicone (Maalox), LIDOcaine viscous 2%; Swish and spit 15 mLs every 4 (four) hours as needed (sore throat). Sore throat  Dispense: 180 mL; Refill: 0                 Patient Instructions   You have tested positive for streptococcus pharyngitis today. Make sure to take antibiotics until completed!!  - You can take over the counter ibuprofen or tylenol for throat pain. You can also do warm salt water gargles to help soothe the throat.  - You are no longer considered contagious once you have had antibiotics in your system for 24 hours.   - Make sure to change out your tooth brush once you have been on antibiotics for over 24 hours to prevent reinfecting yourself. Wash any pillow cases you have slept on or masks that you have worn while infected.  - Do not eat or drink after anyone to prevent infecting someone else with strep throat.     - Rest.    - Drink plenty of fluids.    - Acetaminophen (tylenol) or Ibuprofen (advil,motrin) as directed as needed for fever/pain. Avoid tylenol if you have a history of liver disease. Do not take ibuprofen if you have a history of GI bleeding, kidney disease, or if you take blood thinners.   - Ibuprofen dosing for  adults: 400 mg by mouth every 4-6 hours as needed. Max: 2400 mg/day; Info: use lowest effective dose, shortest effective treatment duration; give w/ food if GI upset occurs.  - Tylenol dosing for adults: [By mouth route, immediate-release form] Dose: 325-1000 mg by mouth every 4-6h as needed; Max: 1 g/4h and 4 g/day from all sources. [By mouth route, extended-release form] Dose: 650-1300 mg Extended Release by mouth every 8h as needed; Max: 4 g/day from all sources.     - You must understand that you have received an Urgent Care treatment only and that you may be released before all of your medical problems are known or treated.   - You, the patient, will arrange for follow up care as instructed.   - If your condition worsens or fails to improve we recommend that you receive another evaluation at the ER immediately or contact your PCP to discuss your concerns or return here.   - Follow up with your PCP or specialty clinic as directed in the next 1-2 weeks if not improved or as needed.  You can call [phone] to schedule an appointment with the appropriate provider.    If your symptoms do not improve or worsen, go to the emergency room immediately.

## 2023-01-18 ENCOUNTER — OFFICE VISIT (OUTPATIENT)
Dept: URGENT CARE | Facility: CLINIC | Age: 28
End: 2023-01-18
Payer: COMMERCIAL

## 2023-01-18 VITALS
BODY MASS INDEX: 24.34 KG/M2 | DIASTOLIC BLOOD PRESSURE: 81 MMHG | TEMPERATURE: 98 F | SYSTOLIC BLOOD PRESSURE: 143 MMHG | HEART RATE: 100 BPM | WEIGHT: 170 LBS | HEIGHT: 70 IN | OXYGEN SATURATION: 98 % | RESPIRATION RATE: 18 BRPM

## 2023-01-18 DIAGNOSIS — M79.671 RIGHT FOOT PAIN: Primary | ICD-10-CM

## 2023-01-18 DIAGNOSIS — M77.9 TENDONITIS: ICD-10-CM

## 2023-01-18 PROCEDURE — 1159F PR MEDICATION LIST DOCUMENTED IN MEDICAL RECORD: ICD-10-PCS | Mod: CPTII,S$GLB,, | Performed by: NURSE PRACTITIONER

## 2023-01-18 PROCEDURE — 3077F SYST BP >= 140 MM HG: CPT | Mod: CPTII,S$GLB,, | Performed by: NURSE PRACTITIONER

## 2023-01-18 PROCEDURE — 73630 XR FOOT COMPLETE 3 VIEW RIGHT: ICD-10-PCS | Mod: RT,S$GLB,, | Performed by: RADIOLOGY

## 2023-01-18 PROCEDURE — 96372 THER/PROPH/DIAG INJ SC/IM: CPT | Mod: S$GLB,,, | Performed by: NURSE PRACTITIONER

## 2023-01-18 PROCEDURE — 3079F DIAST BP 80-89 MM HG: CPT | Mod: CPTII,S$GLB,, | Performed by: NURSE PRACTITIONER

## 2023-01-18 PROCEDURE — 99213 OFFICE O/P EST LOW 20 MIN: CPT | Mod: 25,S$GLB,, | Performed by: NURSE PRACTITIONER

## 2023-01-18 PROCEDURE — 1160F PR REVIEW ALL MEDS BY PRESCRIBER/CLIN PHARMACIST DOCUMENTED: ICD-10-PCS | Mod: CPTII,S$GLB,, | Performed by: NURSE PRACTITIONER

## 2023-01-18 PROCEDURE — 3079F PR MOST RECENT DIASTOLIC BLOOD PRESSURE 80-89 MM HG: ICD-10-PCS | Mod: CPTII,S$GLB,, | Performed by: NURSE PRACTITIONER

## 2023-01-18 PROCEDURE — 73630 X-RAY EXAM OF FOOT: CPT | Mod: RT,S$GLB,, | Performed by: RADIOLOGY

## 2023-01-18 PROCEDURE — 1159F MED LIST DOCD IN RCRD: CPT | Mod: CPTII,S$GLB,, | Performed by: NURSE PRACTITIONER

## 2023-01-18 PROCEDURE — 3008F BODY MASS INDEX DOCD: CPT | Mod: CPTII,S$GLB,, | Performed by: NURSE PRACTITIONER

## 2023-01-18 PROCEDURE — 99213 PR OFFICE/OUTPT VISIT, EST, LEVL III, 20-29 MIN: ICD-10-PCS | Mod: 25,S$GLB,, | Performed by: NURSE PRACTITIONER

## 2023-01-18 PROCEDURE — 96372 PR INJECTION,THERAP/PROPH/DIAG2ST, IM OR SUBCUT: ICD-10-PCS | Mod: S$GLB,,, | Performed by: NURSE PRACTITIONER

## 2023-01-18 PROCEDURE — 3008F PR BODY MASS INDEX (BMI) DOCUMENTED: ICD-10-PCS | Mod: CPTII,S$GLB,, | Performed by: NURSE PRACTITIONER

## 2023-01-18 PROCEDURE — 1160F RVW MEDS BY RX/DR IN RCRD: CPT | Mod: CPTII,S$GLB,, | Performed by: NURSE PRACTITIONER

## 2023-01-18 PROCEDURE — 3077F PR MOST RECENT SYSTOLIC BLOOD PRESSURE >= 140 MM HG: ICD-10-PCS | Mod: CPTII,S$GLB,, | Performed by: NURSE PRACTITIONER

## 2023-01-18 RX ORDER — DICLOFENAC SODIUM 10 MG/G
2 GEL TOPICAL 4 TIMES DAILY
Qty: 2 G | Refills: 0 | Status: SHIPPED | OUTPATIENT
Start: 2023-01-18 | End: 2023-01-21

## 2023-01-18 RX ORDER — KETOROLAC TROMETHAMINE 30 MG/ML
30 INJECTION, SOLUTION INTRAMUSCULAR; INTRAVENOUS
Status: COMPLETED | OUTPATIENT
Start: 2023-01-18 | End: 2023-01-18

## 2023-01-18 RX ORDER — MELOXICAM 7.5 MG/1
7.5 TABLET ORAL DAILY PRN
Qty: 14 TABLET | Refills: 0 | Status: SHIPPED | OUTPATIENT
Start: 2023-01-19 | End: 2023-02-02

## 2023-01-18 RX ADMIN — KETOROLAC TROMETHAMINE 30 MG: 30 INJECTION, SOLUTION INTRAMUSCULAR; INTRAVENOUS at 06:01

## 2023-01-18 NOTE — PROGRESS NOTES
"Subjective:       Patient ID: Yamilka Azul is a 27 y.o. male.    Vitals:  height is 5' 10" (1.778 m) and weight is 77.1 kg (170 lb). His temperature is 98.3 °F (36.8 °C). His blood pressure is 143/81 (abnormal) and his pulse is 100. His respiration is 18 and oxygen saturation is 98%.     Chief Complaint: Foot Pain (Right /)    Pt presents right foot pain. Pain started over a week ago. Pain has gotten worse over the past 3 days. Pt states he only has pain when he is walking, pt states when he is sitting down or elevating his foot he has no pain. Pt states he walk around a lot at work and is unsure if that could be the cause of his foot pain. Pt states no known injury to his right foot. Pain level 6. No OtC medication taken.   Provider note begins below    Patient reports right lateral foot pain.  He was treated for strep on Saturday.  He started having right foot pain the day after.  Denies any injury or trauma.  He states his symptoms have worsened throughout the week.  Pain is worsened with standing.  Pain is relieved with rest.  He has not tried anti-inflammatories ice or wraps.  He wears heavy steel toe work boots.  States the soles of may be worn then.  Denies any twisting or popping joint.  Has had foot pain like this before but never this bad.  Reports a history of a fracture in his heel he is not sure which foot.  He is a type 1 diabetic on insulin.  States last hemoglobin A1c was around 6.      Foot Pain  This is a new problem. The current episode started in the past 7 days. The problem occurs constantly. The problem has been gradually worsening. Associated symptoms include arthralgias. Pertinent negatives include no chest pain, coughing, fatigue, fever, nausea or vomiting. The symptoms are aggravated by walking. He has tried nothing for the symptoms.     Constitution: Negative for fatigue and fever.   Cardiovascular:  Negative for chest pain and sob on exertion.   Respiratory:  Negative for cough and " shortness of breath.    Gastrointestinal:  Negative for nausea, vomiting and constipation.   Musculoskeletal:  Positive for pain, joint pain and pain with walking. Negative for trauma.     Objective:      Physical Exam   Constitutional: He is oriented to person, place, and time.   HENT:   Head: Normocephalic and atraumatic.   Cardiovascular: Normal rate.   Pulses:       Dorsalis pedis pulses are 2+ on the right side.        Posterior tibial pulses are 2+ on the right side.   Pulmonary/Chest: Effort normal. No respiratory distress.   Abdominal: Normal appearance.   Musculoskeletal:      Right ankle: Normal.      Right foot: Anterior drawer test: negative.         Feet:    Neurological: He is alert and oriented to person, place, and time.   Skin: Skin is dry.   Psychiatric: His behavior is normal. Mood normal.         XR FOOT COMPLETE 3 VIEW RIGHT    Result Date: 1/18/2023  EXAMINATION: XR FOOT COMPLETE 3 VIEW RIGHT CLINICAL HISTORY: . Pain in right foot TECHNIQUE: AP, lateral, and oblique views of the right foot were performed. COMPARISON: None FINDINGS: Three views right foot. No acute displaced fracture or dislocation of the foot.  No radiopaque foreign body.  No significant edema.     1. No acute displaced fracture or dislocation of the foot. Electronically signed by: Wolf Schmidt MD Date:    01/18/2023 Time:    17:51    Assessment:       1. Right foot pain          Plan:       Right foot x-ray- no fracture noted likely tendinitis.  Toradol injection   Topical Voltaren gel  Interval laboratories as needed  Follow-up with podiatrist if not improving.  Patient is diabetic.        Right foot pain  -     XR FOOT COMPLETE 3 VIEW RIGHT; Future; Expected date: 01/18/2023

## 2023-01-19 NOTE — PATIENT INSTRUCTIONS
Follow-up with podiatry if pain is not improving.  Rest, ice, elevation, wrapping, anti-inflammatories as needed

## 2023-03-08 ENCOUNTER — PATIENT MESSAGE (OUTPATIENT)
Dept: ENDOCRINOLOGY | Facility: CLINIC | Age: 28
End: 2023-03-08
Payer: COMMERCIAL

## 2023-03-10 DIAGNOSIS — E10.9 TYPE 1 DIABETES MELLITUS WITHOUT COMPLICATION: ICD-10-CM

## 2023-03-10 RX ORDER — INSULIN ASPART 100 [IU]/ML
INJECTION, SOLUTION INTRAVENOUS; SUBCUTANEOUS
Qty: 90 ML | Refills: 3 | Status: SHIPPED | OUTPATIENT
Start: 2023-03-10 | End: 2024-02-15 | Stop reason: SDUPTHER

## 2023-03-10 RX ORDER — BLOOD-GLUCOSE SENSOR
1 EACH MISCELLANEOUS
Qty: 9 EACH | Refills: 3 | Status: SHIPPED | OUTPATIENT
Start: 2023-03-10 | End: 2024-03-09

## 2023-03-10 NOTE — TELEPHONE ENCOUNTER
Pt rescheduled due to no data but is asking for refills on these meds. He is going to ask his wife to see if she can figure how to get him reconnected and using the correct apps.

## 2023-03-10 NOTE — TELEPHONE ENCOUNTER
----- Message from Alexandre Santos sent at 3/10/2023  8:19 AM CST -----  Type:  Patient Returning Call    Who Called:Pt     Who Left Message for Patient:Clif Catherine    Does the patient know what this is regarding?: yes pt states that  1 PM will work of him     Would the patient rather a call back or a response via [facility]?  Call back     Best Call Back Number: [phone] (mobile)     Additional Information:

## 2023-03-29 ENCOUNTER — PATIENT MESSAGE (OUTPATIENT)
Dept: ENDOCRINOLOGY | Facility: CLINIC | Age: 28
End: 2023-03-29
Payer: COMMERCIAL

## 2023-03-31 ENCOUNTER — LAB VISIT (OUTPATIENT)
Dept: LAB | Facility: OTHER | Age: 28
End: 2023-03-31
Attending: INTERNAL MEDICINE
Payer: COMMERCIAL

## 2023-03-31 DIAGNOSIS — E10.9 TYPE 1 DIABETES MELLITUS WITHOUT COMPLICATION: ICD-10-CM

## 2023-03-31 LAB
ALBUMIN SERPL BCP-MCNC: 4.5 G/DL (ref 3.5–5.2)
ALP SERPL-CCNC: 88 U/L (ref 55–135)
ALT SERPL W/O P-5'-P-CCNC: 42 U/L (ref 10–44)
ANION GAP SERPL CALC-SCNC: 6 MMOL/L (ref 8–16)
AST SERPL-CCNC: 23 U/L (ref 10–40)
BILIRUB SERPL-MCNC: 1.2 MG/DL (ref 0.1–1)
BUN SERPL-MCNC: 17 MG/DL (ref 6–20)
CALCIUM SERPL-MCNC: 10 MG/DL (ref 8.7–10.5)
CHLORIDE SERPL-SCNC: 106 MMOL/L (ref 95–110)
CO2 SERPL-SCNC: 29 MMOL/L (ref 23–29)
CREAT SERPL-MCNC: 1 MG/DL (ref 0.5–1.4)
EST. GFR  (NO RACE VARIABLE): >60 ML/MIN/1.73 M^2
ESTIMATED AVG GLUCOSE: 140 MG/DL (ref 68–131)
GLUCOSE SERPL-MCNC: 124 MG/DL (ref 70–110)
HBA1C MFR BLD: 6.5 % (ref 4–5.6)
POTASSIUM SERPL-SCNC: 4.5 MMOL/L (ref 3.5–5.1)
PROT SERPL-MCNC: 7.4 G/DL (ref 6–8.4)
SODIUM SERPL-SCNC: 141 MMOL/L (ref 136–145)
TSH SERPL DL<=0.005 MIU/L-ACNC: 0.74 UIU/ML (ref 0.4–4)

## 2023-03-31 PROCEDURE — 84443 ASSAY THYROID STIM HORMONE: CPT | Performed by: INTERNAL MEDICINE

## 2023-03-31 PROCEDURE — 36415 COLL VENOUS BLD VENIPUNCTURE: CPT | Performed by: INTERNAL MEDICINE

## 2023-03-31 PROCEDURE — 80053 COMPREHEN METABOLIC PANEL: CPT | Performed by: INTERNAL MEDICINE

## 2023-03-31 PROCEDURE — 83036 HEMOGLOBIN GLYCOSYLATED A1C: CPT | Performed by: INTERNAL MEDICINE

## 2023-04-18 ENCOUNTER — TELEPHONE (OUTPATIENT)
Dept: ENDOCRINOLOGY | Facility: CLINIC | Age: 28
End: 2023-04-18
Payer: COMMERCIAL

## 2023-04-18 NOTE — TELEPHONE ENCOUNTER
----- Message from Mariya Dowling sent at 4/18/2023  8:27 AM CDT -----  Regarding: Appt Access  Contact: [phone]  Pt needs to r/s 04/21/23 appt.  No appts avail.  Please call.     
Pt is trying to reschedule again. Anywhere else you want to fit him in?   
Pt rescheduled to may 8 @ 1 pm  
Yes

## 2023-05-08 ENCOUNTER — OFFICE VISIT (OUTPATIENT)
Dept: ENDOCRINOLOGY | Facility: CLINIC | Age: 28
End: 2023-05-08
Payer: COMMERCIAL

## 2023-05-08 VITALS
BODY MASS INDEX: 26.04 KG/M2 | HEART RATE: 78 BPM | DIASTOLIC BLOOD PRESSURE: 80 MMHG | SYSTOLIC BLOOD PRESSURE: 122 MMHG | WEIGHT: 181.88 LBS | HEIGHT: 70 IN | OXYGEN SATURATION: 99 %

## 2023-05-08 DIAGNOSIS — E10.9 TYPE 1 DIABETES MELLITUS WITHOUT COMPLICATION: Primary | ICD-10-CM

## 2023-05-08 PROCEDURE — 3044F PR MOST RECENT HEMOGLOBIN A1C LEVEL <7.0%: ICD-10-PCS | Mod: CPTII,S$GLB,, | Performed by: INTERNAL MEDICINE

## 2023-05-08 PROCEDURE — 3044F HG A1C LEVEL LT 7.0%: CPT | Mod: CPTII,S$GLB,, | Performed by: INTERNAL MEDICINE

## 2023-05-08 PROCEDURE — 95251 PR GLUCOSE MONITOR, 72 HOUR, PHYS INTERP: ICD-10-PCS | Mod: S$GLB,,, | Performed by: INTERNAL MEDICINE

## 2023-05-08 PROCEDURE — 3074F SYST BP LT 130 MM HG: CPT | Mod: CPTII,S$GLB,, | Performed by: INTERNAL MEDICINE

## 2023-05-08 PROCEDURE — 1160F RVW MEDS BY RX/DR IN RCRD: CPT | Mod: CPTII,S$GLB,, | Performed by: INTERNAL MEDICINE

## 2023-05-08 PROCEDURE — 3079F PR MOST RECENT DIASTOLIC BLOOD PRESSURE 80-89 MM HG: ICD-10-PCS | Mod: CPTII,S$GLB,, | Performed by: INTERNAL MEDICINE

## 2023-05-08 PROCEDURE — 3008F PR BODY MASS INDEX (BMI) DOCUMENTED: ICD-10-PCS | Mod: CPTII,S$GLB,, | Performed by: INTERNAL MEDICINE

## 2023-05-08 PROCEDURE — 1159F MED LIST DOCD IN RCRD: CPT | Mod: CPTII,S$GLB,, | Performed by: INTERNAL MEDICINE

## 2023-05-08 PROCEDURE — 1159F PR MEDICATION LIST DOCUMENTED IN MEDICAL RECORD: ICD-10-PCS | Mod: CPTII,S$GLB,, | Performed by: INTERNAL MEDICINE

## 2023-05-08 PROCEDURE — 3074F PR MOST RECENT SYSTOLIC BLOOD PRESSURE < 130 MM HG: ICD-10-PCS | Mod: CPTII,S$GLB,, | Performed by: INTERNAL MEDICINE

## 2023-05-08 PROCEDURE — 99214 PR OFFICE/OUTPT VISIT, EST, LEVL IV, 30-39 MIN: ICD-10-PCS | Mod: 25,S$GLB,, | Performed by: INTERNAL MEDICINE

## 2023-05-08 PROCEDURE — 3008F BODY MASS INDEX DOCD: CPT | Mod: CPTII,S$GLB,, | Performed by: INTERNAL MEDICINE

## 2023-05-08 PROCEDURE — 99999 PR PBB SHADOW E&M-EST. PATIENT-LVL IV: CPT | Mod: PBBFAC,,,

## 2023-05-08 PROCEDURE — 99214 OFFICE O/P EST MOD 30 MIN: CPT | Mod: 25,S$GLB,, | Performed by: INTERNAL MEDICINE

## 2023-05-08 PROCEDURE — 99999 PR PBB SHADOW E&M-EST. PATIENT-LVL IV: ICD-10-PCS | Mod: PBBFAC,,,

## 2023-05-08 PROCEDURE — 95251 CONT GLUC MNTR ANALYSIS I&R: CPT | Mod: S$GLB,,, | Performed by: INTERNAL MEDICINE

## 2023-05-08 PROCEDURE — 1160F PR REVIEW ALL MEDS BY PRESCRIBER/CLIN PHARMACIST DOCUMENTED: ICD-10-PCS | Mod: CPTII,S$GLB,, | Performed by: INTERNAL MEDICINE

## 2023-05-08 PROCEDURE — 3079F DIAST BP 80-89 MM HG: CPT | Mod: CPTII,S$GLB,, | Performed by: INTERNAL MEDICINE

## 2023-05-08 NOTE — PROGRESS NOTES
Yamilka Azul is a 27 y.o. male presenting for follow-up of T1DM      History of Present Illness  T1DM  Diagnosed at age 15  Known complications: denies    Strong family history of type 1 diabetes: grandmother, maternal uncle, cousin's child, mother and sister with T1DM. We have done FREYA testing which was negative    Has been on pump therapy since time of diagnosis  Using Tandem TSlim with controlIQ    Doing well since last visit. Lows when leaving work around 4 PM and then highs after dinner  Much more consistent about bolusing for meals    Due for new pump soon    Current Diabetes Regimen:  Tandem TSlim with Control IQ- novolog    Pump settings:  12 AM Basal 1.2 ISF 1:35 CR 1:9 Target 120  6 AM Basal 1.6 ISF 1:35 CR 1:9 Target 120  6 PM Basal 1.8  ISF 1:35 CR 1:9 Target 120    Diet/Exercise:  Coffee in morning- drinks slowly over few hours  Small protein yogurt midday  Salads for lunch  Maybe some snack in afternoon if sugar going down  Dinner main meal    Bigger meals on weekends      Last Hgb A1C:  Lab Results   Component Value Date    HGBA1C 6.5 (H) 03/31/2023        Glucose Monitoring:  Meter/CGM: Dexcom G6  Reviewed and scanned into media        Hypoglycemic Episodes:see above, rare    DKA: never    Screening / DM Complications:    Nephropathy:  ACEi/ARB: Not taking  No results found for: MICALBCREAT    Last Lipid Panel:  Statin: Not taking  Lab Results   Component Value Date    LDLCALC 101.2 02/26/2021       Last foot exam : 11/11/2021  Last eye exam : 05/14/2021;  no laser surgery or DR    Lab Results   Component Value Date    TSH 0.737 03/31/2023       No results found for: TTGIGA        Current Outpatient Medications:     albuterol (PROVENTIL/VENTOLIN HFA) 90 mcg/actuation inhaler, Inhale 2 puffs into the lungs every 4 (four) hours as needed for Wheezing. Rescue, Disp: 6.7 g, Rfl: 0    blood-glucose sensor (DEXCOM G6 SENSOR) Nette, 1 Device by Misc.(Non-Drug; Combo Route) route every 10 days., Disp:  9 each, Rfl: 3    blood-glucose transmitter (DEXCOM G6 TRANSMITTER) Nette, 1 Device by Misc.(Non-Drug; Combo Route) route every 3 (three) months., Disp: 1 each, Rfl: 3    glucagon 1 mg/0.2 mL Syrg, Inject 1 mg into the skin daily as needed (low blood sugar)., Disp: 0.2 mL, Rfl: 5    insulin (BASAGLAR KWIKPEN U-100 INSULIN) glargine 100 units/mL (3mL) SubQ pen, Take 40 units daily in instance of pump failure, Disp: 3 Syringe, Rfl: 1    NOVOLOG U-100 INSULIN ASPART 100 unit/mL injection, Inject up to 100 units daily via insulin pump, Disp: 90 mL, Rfl: 3    diclofenac sodium (VOLTAREN) 1 % Gel, Apply 2 g topically 4 (four) times daily. for 3 days, Disp: 2 g, Rfl: 0    ROS as above    Objective:     Vitals:    05/08/23 1305   BP: 122/80   Pulse: 78     Wt Readings from Last 3 Encounters:   05/08/23 82.5 kg (181 lb 14.1 oz)   01/18/23 77.1 kg (170 lb)   01/14/23 77.1 kg (170 lb)     Body mass index is 26.1 kg/m².  Physical Exam  Constitutional:       Appearance: He is well-developed.   HENT:      Head: Normocephalic.   Eyes:      Conjunctiva/sclera: Conjunctivae normal.   Neck:      Comments: No thyromegaly  Pulmonary:      Effort: Pulmonary effort is normal.   Musculoskeletal:         General: Normal range of motion.   Skin:     General: Skin is warm.      Findings: No rash.   Neurological:      Mental Status: He is alert and oriented to person, place, and time.   Protective Sensation (w/ 10 gram monofilament):  Right: Intact  Left: Intact    Visual Inspection:  Normal -  Bilateral    Pedal Pulses:   Right: Present  Left: Present    Posterior Tibialis Pulses:   Right:Present  Left: Present    No lipohypertrophy      Labs    Chemistry        Component Value Date/Time     03/31/2023 1047    K 4.5 03/31/2023 1047     03/31/2023 1047    CO2 29 03/31/2023 1047    BUN 17 03/31/2023 1047    CREATININE 1.0 03/31/2023 1047     (H) 03/31/2023 1046        Component Value Date/Time    CALCIUM 10.0 03/31/2023 1049     ALKPHOS 88 03/31/2023 1047    AST 23 03/31/2023 1047    ALT 42 03/31/2023 1047    BILITOT 1.2 (H) 03/31/2023 1047    ESTGFRAFRICA >60.0 07/23/2021 0806    EGFRNONAA >60.0 07/23/2021 0806              Assessment and Plan     Type 1 diabetes  Pump and sensor reviewed and significant for lows in afternoon and hyperglycemia with dinner. Will adjust settings as below  A1c at goal but ideally would improve TIR  Discussed pump options and he would like to stay with Tandem  Due for dilated eye exam, he will schedule    New pump settings:    AM Basal 1.2 ISF 1:35 CR 1:9 Target 120  6 AM Basal 1.6 ISF 1:35 CR 1:9 Target 120  2 PM Basal 1.3 ISF 1:45 CR 1:9  5:30 PM Basal 1.8  ISF 1:35 CR 1:7 Target 120    He and his wife have glucagon and back-up basal at home    Pump backup plan    If the insulin pump is non functional and discontinued for anticipated more than 20 hours, please give daily injections of:  Long acting insulin: Basaglar 40 units daily  Short acting insulin:  novolog with carb ratio of 1 unit per 8 grams and correction 1 unit per 40    When the insulin pump is restarted, do not restart basal rates until at least 22 hours after the last long acting insulin injection. You can set a 0% temporary basal setting that will last until this time and use your pump to bolus for meals and correction.    For any technical insulin pump issues, please contact the insulin pump company; the toll free number is printed on the label on the back of the insulin pump.        Face to Face time with patient: 20 min  40 minutes of total time spent on the encounter, which includes face to face time and non-face to face time preparing to see the patient (eg, review of tests), Obtaining and/or reviewing separately obtained history, Documenting clinical information in the electronic or other health record, Independently interpreting results (not separately reported) and communicating results to the patient/family/caregiver, or Care  coordination (not separately reported).   Additional time spent reviewing CGM data      RTC 6 months    Catherine Menezes MD

## 2023-05-08 NOTE — PATIENT INSTRUCTIONS
[employer] website for skin solutions:  [email or web address]    AM Basal 1.2 ISF 1:35 CR 1:9 Target 120  6 AM Basal 1.6 ISF 1:35 CR 1:9 Target 120  2 PM Basal 1.3 ISF 1:45 CR 1:9  5:30 PM Basal 1.8  ISF 1:35 CR 1:7 Target 120     I would like to see you for a follow-up visit in 6 months which will be in November. My schedule for then will open at the beginning of July so please set yourself a reminder to schedule a visit when it opens. The spots fill quickly so please do this at the beginning of the month. You can reach out to us for help with scheduling or book yourself on the portal.

## 2023-05-08 NOTE — ASSESSMENT & PLAN NOTE
Pump and sensor reviewed and significant for lows in afternoon and hyperglycemia with dinner. Will adjust settings as below  A1c at goal but ideally would improve TIR  Discussed pump options and he would like to stay with Tandem  Due for dilated eye exam, he will schedule    New pump settings:    AM Basal 1.2 ISF 1:35 CR 1:9 Target 120  6 AM Basal 1.6 ISF 1:35 CR 1:9 Target 120  2 PM Basal 1.3 ISF 1:45 CR 1:9  5:30 PM Basal 1.8  ISF 1:35 CR 1:7 Target 120    He and his wife have glucagon and back-up basal at home    Pump backup plan    If the insulin pump is non functional and discontinued for anticipated more than 20 hours, please give daily injections of:  Long acting insulin: Basaglar 40 units daily  Short acting insulin:  novolog with carb ratio of 1 unit per 8 grams and correction 1 unit per 40    When the insulin pump is restarted, do not restart basal rates until at least 22 hours after the last long acting insulin injection. You can set a 0% temporary basal setting that will last until this time and use your pump to bolus for meals and correction.    For any technical insulin pump issues, please contact the insulin pump company; the toll free number is printed on the label on the back of the insulin pump.

## 2023-06-07 ENCOUNTER — PATIENT MESSAGE (OUTPATIENT)
Dept: ENDOCRINOLOGY | Facility: CLINIC | Age: 28
End: 2023-06-07
Payer: COMMERCIAL

## 2023-06-09 ENCOUNTER — PATIENT MESSAGE (OUTPATIENT)
Dept: ENDOCRINOLOGY | Facility: CLINIC | Age: 28
End: 2023-06-09
Payer: COMMERCIAL

## 2023-06-09 DIAGNOSIS — E10.9 TYPE 1 DIABETES MELLITUS WITHOUT COMPLICATION: Primary | ICD-10-CM

## 2023-06-09 DIAGNOSIS — Z96.41 INSULIN PUMP STATUS: ICD-10-CM

## 2023-06-09 RX ORDER — INSULIN PUMP CARTRIDGE
1 CARTRIDGE (EA) SUBCUTANEOUS
Qty: 30 EACH | Refills: 3 | Status: SHIPPED | OUTPATIENT
Start: 2023-06-09 | End: 2024-06-08

## 2023-06-09 RX ORDER — INFUSION SET FOR INSULIN PUMP
1 INFUSION SETS-PARAPHERNALIA MISCELLANEOUS
Qty: 30 EACH | Refills: 3 | Status: SHIPPED | OUTPATIENT
Start: 2023-06-09 | End: 2024-06-08

## 2023-08-09 ENCOUNTER — OCCUPATIONAL HEALTH (OUTPATIENT)
Dept: URGENT CARE | Facility: CLINIC | Age: 28
End: 2023-08-09

## 2023-08-09 DIAGNOSIS — Z02.1 PRE-EMPLOYMENT EXAMINATION: Primary | ICD-10-CM

## 2023-08-09 LAB — BREATH ALCOHOL: 0

## 2023-08-09 PROCEDURE — 80305 DRUG TEST PRSMV DIR OPT OBS: CPT | Mod: S$GLB,,, | Performed by: FAMILY MEDICINE

## 2023-08-09 PROCEDURE — 80305 OOH COLLECTION ONLY DRUG SCREEN: ICD-10-PCS | Mod: S$GLB,,, | Performed by: FAMILY MEDICINE

## 2023-08-09 PROCEDURE — 82075 ASSAY OF BREATH ETHANOL: CPT | Mod: S$GLB,,, | Performed by: FAMILY MEDICINE

## 2023-08-09 PROCEDURE — 82075 POCT BREATH ALCOHOL TEST: ICD-10-PCS | Mod: S$GLB,,, | Performed by: FAMILY MEDICINE

## 2023-08-11 ENCOUNTER — PATIENT MESSAGE (OUTPATIENT)
Dept: RESEARCH | Facility: HOSPITAL | Age: 28
End: 2023-08-11
Payer: COMMERCIAL

## 2023-12-06 ENCOUNTER — TELEPHONE (OUTPATIENT)
Dept: ENDOCRINOLOGY | Facility: CLINIC | Age: 28
End: 2023-12-06
Payer: COMMERCIAL

## 2023-12-06 NOTE — TELEPHONE ENCOUNTER
"----- Message from Jessie Enciso sent at 12/6/2023  8:25 AM CST -----  Regarding: appt access  Contact: [phone]  Name Of Caller: self     Contact Preference?:  [phone]     What is the nature of the call?: need to schedule an appt for a f/u. He would like to be seen asap due to he can't order his supplies until he's seen     Additional Notes:    "Thank you for all that you do for our patients"        "

## 2023-12-07 ENCOUNTER — OFFICE VISIT (OUTPATIENT)
Dept: ENDOCRINOLOGY | Facility: CLINIC | Age: 28
End: 2023-12-07
Payer: COMMERCIAL

## 2023-12-07 VITALS — BODY MASS INDEX: 26.67 KG/M2 | WEIGHT: 186.31 LBS | HEIGHT: 70 IN

## 2023-12-07 DIAGNOSIS — E10.9 TYPE 1 DIABETES MELLITUS WITHOUT COMPLICATION: Primary | ICD-10-CM

## 2023-12-07 DIAGNOSIS — E78.5 HYPERLIPIDEMIA, UNSPECIFIED HYPERLIPIDEMIA TYPE: ICD-10-CM

## 2023-12-07 DIAGNOSIS — Z96.41 INSULIN PUMP STATUS: ICD-10-CM

## 2023-12-07 PROCEDURE — 95251 PR GLUCOSE MONITOR, 72 HOUR, PHYS INTERP: ICD-10-PCS | Mod: S$GLB,,, | Performed by: INTERNAL MEDICINE

## 2023-12-07 PROCEDURE — 3008F BODY MASS INDEX DOCD: CPT | Mod: CPTII,S$GLB,, | Performed by: INTERNAL MEDICINE

## 2023-12-07 PROCEDURE — 99999 PR PBB SHADOW E&M-EST. PATIENT-LVL III: CPT | Mod: PBBFAC,,,

## 2023-12-07 PROCEDURE — 3044F PR MOST RECENT HEMOGLOBIN A1C LEVEL <7.0%: ICD-10-PCS | Mod: CPTII,S$GLB,, | Performed by: INTERNAL MEDICINE

## 2023-12-07 PROCEDURE — 1160F RVW MEDS BY RX/DR IN RCRD: CPT | Mod: CPTII,S$GLB,, | Performed by: INTERNAL MEDICINE

## 2023-12-07 PROCEDURE — 99214 PR OFFICE/OUTPT VISIT, EST, LEVL IV, 30-39 MIN: ICD-10-PCS | Mod: 25,S$GLB,, | Performed by: INTERNAL MEDICINE

## 2023-12-07 PROCEDURE — 1159F MED LIST DOCD IN RCRD: CPT | Mod: CPTII,S$GLB,, | Performed by: INTERNAL MEDICINE

## 2023-12-07 PROCEDURE — 3008F PR BODY MASS INDEX (BMI) DOCUMENTED: ICD-10-PCS | Mod: CPTII,S$GLB,, | Performed by: INTERNAL MEDICINE

## 2023-12-07 PROCEDURE — 1159F PR MEDICATION LIST DOCUMENTED IN MEDICAL RECORD: ICD-10-PCS | Mod: CPTII,S$GLB,, | Performed by: INTERNAL MEDICINE

## 2023-12-07 PROCEDURE — 3044F HG A1C LEVEL LT 7.0%: CPT | Mod: CPTII,S$GLB,, | Performed by: INTERNAL MEDICINE

## 2023-12-07 PROCEDURE — 1160F PR REVIEW ALL MEDS BY PRESCRIBER/CLIN PHARMACIST DOCUMENTED: ICD-10-PCS | Mod: CPTII,S$GLB,, | Performed by: INTERNAL MEDICINE

## 2023-12-07 PROCEDURE — 95251 CONT GLUC MNTR ANALYSIS I&R: CPT | Mod: S$GLB,,, | Performed by: INTERNAL MEDICINE

## 2023-12-07 PROCEDURE — 99999 PR PBB SHADOW E&M-EST. PATIENT-LVL III: ICD-10-PCS | Mod: PBBFAC,,,

## 2023-12-07 PROCEDURE — 99214 OFFICE O/P EST MOD 30 MIN: CPT | Mod: 25,S$GLB,, | Performed by: INTERNAL MEDICINE

## 2023-12-07 NOTE — PROGRESS NOTES
"Yamilka Azul is a 28 y.o. male presenting for follow-up of T1DM      History of Present Illness  T1DM  Diagnosed at age 15  Known complications: denies    Strong family history of type 1 diabetes: grandmother, maternal uncle, cousin's child, mother and sister with T1DM. We have done FREYA testing which was negative    Has been on pump therapy since time of diagnosis  Using Tandem TSlim with controlIQ    Notices highs after lunch which are resistant to his corrections  Some highs after dinner although not as significant or consistently as lunch    Current Diabetes Regimen:  Tandem TSlim with Control IQ- novolog    Pump settings:            Diet/Exercise:  Coffee in morning- drinks slowly over few hours  Small protein yogurt midday  Salads for lunch  Maybe some snack in afternoon if sugar going down  Dinner main meal    Bigger meals on weekends      Last Hgb A1C:  12/7/23 A1c 7.4%    Glucose Monitoring:  Meter/CGM: Dexcom G6  Reviewed and scanned into media        Hypoglycemic Episodes:see above, rare    DKA: never    Screening / DM Complications:    Nephropathy:  ACEi/ARB: Not taking  No results found for: "MICALBCREAT"    Last Lipid Panel:  Statin: Not taking  Lab Results   Component Value Date    LDLCALC 101.2 02/26/2021       Last foot exam : 05/08/2023  Last eye exam : 05/14/2021;  no laser surgery or DR, due for dilated exam    Lab Results   Component Value Date    TSH 0.737 03/31/2023       No results found for: "TTGIGA"        Current Outpatient Medications:     albuterol (PROVENTIL/VENTOLIN HFA) 90 mcg/actuation inhaler, Inhale 2 puffs into the lungs every 4 (four) hours as needed for Wheezing. Rescue, Disp: 6.7 g, Rfl: 0    blood-glucose sensor (DEXCOM G6 SENSOR) Nette, 1 Device by Misc.(Non-Drug; Combo Route) route every 10 days., Disp: 9 each, Rfl: 3    blood-glucose transmitter (DEXCOM G6 TRANSMITTER) Nette, 1 Device by Misc.(Non-Drug; Combo Route) route every 3 (three) months., Disp: 1 each, Rfl: 3   " " glucagon 1 mg/0.2 mL Syrg, Inject 1 mg into the skin daily as needed (low blood sugar)., Disp: 0.2 mL, Rfl: 5    infusion set for insulin pump (AUTOSOFT XC INFUSION SET 23") ISet, 1 each by Misc.(Non-Drug; Combo Route) route Every 3 (three) days., Disp: 30 each, Rfl: 3    insulin (BASAGLAR KWIKPEN U-100 INSULIN) glargine 100 units/mL (3mL) SubQ pen, Take 40 units daily in instance of pump failure, Disp: 3 Syringe, Rfl: 1    insulin pump cartridge (T:SLIM X2) Crtg, Inject 1 each into the skin Every 3 (three) days., Disp: 30 each, Rfl: 3    NOVOLOG U-100 INSULIN ASPART 100 unit/mL injection, Inject up to 100 units daily via insulin pump, Disp: 90 mL, Rfl: 3    diclofenac sodium (VOLTAREN) 1 % Gel, Apply 2 g topically 4 (four) times daily. for 3 days, Disp: 2 g, Rfl: 0    ROS as above    Objective:     There were no vitals filed for this visit.    Wt Readings from Last 3 Encounters:   12/07/23 84.5 kg (186 lb 4.6 oz)   05/08/23 82.5 kg (181 lb 14.1 oz)   01/18/23 77.1 kg (170 lb)     Body mass index is 26.73 kg/m².  Physical Exam  Constitutional:       Appearance: He is well-developed.   HENT:      Head: Normocephalic.   Eyes:      Conjunctiva/sclera: Conjunctivae normal.   Neck:      Comments: No thyromegaly  Pulmonary:      Effort: Pulmonary effort is normal.   Musculoskeletal:         General: Normal range of motion.   Skin:     General: Skin is warm.      Findings: No rash.   Neurological:      Mental Status: He is alert and oriented to person, place, and time.         Labs    Chemistry        Component Value Date/Time     03/31/2023 1047    K 4.5 03/31/2023 1047     03/31/2023 1047    CO2 29 03/31/2023 1047    BUN 17 03/31/2023 1047    CREATININE 1.0 03/31/2023 1047     (H) 03/31/2023 1047        Component Value Date/Time    CALCIUM 10.0 03/31/2023 1047    ALKPHOS 88 03/31/2023 1047    AST 23 03/31/2023 1047    ALT 42 03/31/2023 1047    BILITOT 1.2 (H) 03/31/2023 1047    ESTGFRAFRICA >60.0 " 07/23/2021 0806    EGFRNONAA >60.0 07/23/2021 0806              Assessment and Plan     Type 1 diabetes  Pump and sensor download reviewed and with hyperglycemia after lunch which does not correct for several hours and occasional highs after dinner  Discussed upgrade to G7 with Tandem    Will do the following:  Patient Instructions   11:30 AM Carb ratio 1:8, ISF 1:35    If in a few weeks you are still high after dinner, can make dinner carb ratio 6.5    You will receive an e-mail with steps to upgrade your Tandem pump to be compatible with Dexcom G7. There are a few other changes included in this update like duration on exercise mode and new alert sounds  Once the pump software is updated you will then be able to choose between Dexcom G6 or G7    Currently this comes only from a DME supply company because it is a little different than the original G7 that is available at pharmacy. Eventually it will be available at the pharmacy as well    Dexcom G7 Tips:  Approved for use on the arm but many people are wearing it on their abdomen   Recommend using SkinTac underneath and an overlay patch on top as it does tend to fall off more than G6  It is still a 10 day wear but now a 30 minute warmup  The G7 is one piece with the transmitter built in.  The entire thing is disposable after 10 days    I would like to see you for a follow-up visit in 6 months which will be in May of 2024. My schedule for then will open at the beginning of January so please set yourself a reminder to schedule a visit when it opens. The spots fill quickly so please do this at the beginning of the month. You can reach out to us for help with scheduling or book yourself on the portal.    Update labs now    Will schedule dilated eye exam    Insulin pump status  As above    Hyperlipidemia  Update with next labs        Face to Face time with patient: 20 min  30 minutes of total time spent on the encounter, which includes face to face time and non-face to  face time preparing to see the patient (eg, review of tests), Obtaining and/or reviewing separately obtained history, Documenting clinical information in the electronic or other health record, Independently interpreting results (not separately reported) and communicating results to the patient/family/caregiver, or Care coordination (not separately reported).   Additional time spent reviewing CGM data      RTC 6 months    Catherine Menezes MD

## 2023-12-07 NOTE — ASSESSMENT & PLAN NOTE
Pump and sensor download reviewed and with hyperglycemia after lunch which does not correct for several hours and occasional highs after dinner  Discussed upgrade to G7 with Tandem    Will do the following:  Patient Instructions   11:30 AM Carb ratio 1:8, ISF 1:35    If in a few weeks you are still high after dinner, can make dinner carb ratio 6.5    You will receive an e-mail with steps to upgrade your Tandem pump to be compatible with Dexcom G7. There are a few other changes included in this update like duration on exercise mode and new alert sounds  Once the pump software is updated you will then be able to choose between Dexcom G6 or G7    Currently this comes only from a DME supply company because it is a little different than the original G7 that is available at pharmacy. Eventually it will be available at the pharmacy as well    Dexcom G7 Tips:  Approved for use on the arm but many people are wearing it on their abdomen   Recommend using SkinTac underneath and an overlay patch on top as it does tend to fall off more than G6  It is still a 10 day wear but now a 30 minute warmup  The G7 is one piece with the transmitter built in.  The entire thing is disposable after 10 days    I would like to see you for a follow-up visit in 6 months which will be in May of 2024. My schedule for then will open at the beginning of January so please set yourself a reminder to schedule a visit when it opens. The spots fill quickly so please do this at the beginning of the month. You can reach out to us for help with scheduling or book yourself on the portal.    Update labs now    Will schedule dilated eye exam

## 2024-01-03 ENCOUNTER — LAB VISIT (OUTPATIENT)
Dept: LAB | Facility: OTHER | Age: 29
End: 2024-01-03
Attending: INTERNAL MEDICINE
Payer: COMMERCIAL

## 2024-01-03 DIAGNOSIS — E10.9 TYPE 1 DIABETES MELLITUS WITHOUT COMPLICATION: ICD-10-CM

## 2024-01-03 LAB
ALBUMIN SERPL BCP-MCNC: 4.6 G/DL (ref 3.5–5.2)
ALP SERPL-CCNC: 101 U/L (ref 55–135)
ALT SERPL W/O P-5'-P-CCNC: 52 U/L (ref 10–44)
ANION GAP SERPL CALC-SCNC: 10 MMOL/L (ref 8–16)
AST SERPL-CCNC: 25 U/L (ref 10–40)
BILIRUB SERPL-MCNC: 0.9 MG/DL (ref 0.1–1)
BUN SERPL-MCNC: 16 MG/DL (ref 6–20)
CALCIUM SERPL-MCNC: 10 MG/DL (ref 8.7–10.5)
CHLORIDE SERPL-SCNC: 103 MMOL/L (ref 95–110)
CHOLEST SERPL-MCNC: 246 MG/DL (ref 120–199)
CHOLEST/HDLC SERPL: 3 {RATIO} (ref 2–5)
CO2 SERPL-SCNC: 27 MMOL/L (ref 23–29)
CREAT SERPL-MCNC: 1 MG/DL (ref 0.5–1.4)
EST. GFR  (NO RACE VARIABLE): >60 ML/MIN/1.73 M^2
ESTIMATED AVG GLUCOSE: 134 MG/DL (ref 68–131)
GLUCOSE SERPL-MCNC: 136 MG/DL (ref 70–110)
HBA1C MFR BLD: 6.3 % (ref 4–5.6)
HDLC SERPL-MCNC: 83 MG/DL (ref 40–75)
HDLC SERPL: 33.7 % (ref 20–50)
LDLC SERPL CALC-MCNC: 151.4 MG/DL (ref 63–159)
NONHDLC SERPL-MCNC: 163 MG/DL
POTASSIUM SERPL-SCNC: 4.2 MMOL/L (ref 3.5–5.1)
PROT SERPL-MCNC: 7.6 G/DL (ref 6–8.4)
SODIUM SERPL-SCNC: 140 MMOL/L (ref 136–145)
TRIGL SERPL-MCNC: 58 MG/DL (ref 30–150)
TSH SERPL DL<=0.005 MIU/L-ACNC: 0.8 UIU/ML (ref 0.4–4)

## 2024-01-03 PROCEDURE — 83036 HEMOGLOBIN GLYCOSYLATED A1C: CPT | Performed by: INTERNAL MEDICINE

## 2024-01-03 PROCEDURE — 80061 LIPID PANEL: CPT | Performed by: INTERNAL MEDICINE

## 2024-01-03 PROCEDURE — 84443 ASSAY THYROID STIM HORMONE: CPT | Performed by: INTERNAL MEDICINE

## 2024-01-03 PROCEDURE — 36415 COLL VENOUS BLD VENIPUNCTURE: CPT | Performed by: INTERNAL MEDICINE

## 2024-01-03 PROCEDURE — 80053 COMPREHEN METABOLIC PANEL: CPT | Performed by: INTERNAL MEDICINE

## 2024-01-04 ENCOUNTER — PATIENT MESSAGE (OUTPATIENT)
Dept: ENDOCRINOLOGY | Facility: CLINIC | Age: 29
End: 2024-01-04
Payer: COMMERCIAL

## 2024-01-12 ENCOUNTER — PATIENT MESSAGE (OUTPATIENT)
Dept: ENDOCRINOLOGY | Facility: CLINIC | Age: 29
End: 2024-01-12
Payer: COMMERCIAL

## 2024-02-15 DIAGNOSIS — E10.9 TYPE 1 DIABETES MELLITUS WITHOUT COMPLICATION: ICD-10-CM

## 2024-02-15 RX ORDER — INSULIN ASPART 100 [IU]/ML
INJECTION, SOLUTION INTRAVENOUS; SUBCUTANEOUS
Qty: 90 ML | Refills: 3 | Status: SHIPPED | OUTPATIENT
Start: 2024-02-15

## 2024-08-14 ENCOUNTER — TELEPHONE (OUTPATIENT)
Dept: ENDOCRINOLOGY | Facility: CLINIC | Age: 29
End: 2024-08-14
Payer: COMMERCIAL

## 2024-08-14 NOTE — TELEPHONE ENCOUNTER
----- Message from Edwige Eugene sent at 8/14/2024 10:12 AM CDT -----  Regarding: pt advice  Contact: Vanessa [phone]  Vanessa /[facility] calling to check status of prescription of insulin pump supplies that was faxed on 8/12

## 2024-08-15 ENCOUNTER — OFFICE VISIT (OUTPATIENT)
Dept: ENDOCRINOLOGY | Facility: CLINIC | Age: 29
End: 2024-08-15
Payer: COMMERCIAL

## 2024-08-15 VITALS
DIASTOLIC BLOOD PRESSURE: 76 MMHG | BODY MASS INDEX: 25.62 KG/M2 | SYSTOLIC BLOOD PRESSURE: 124 MMHG | HEIGHT: 70 IN | HEART RATE: 78 BPM | OXYGEN SATURATION: 99 % | WEIGHT: 179 LBS

## 2024-08-15 DIAGNOSIS — E10.9 TYPE 1 DIABETES MELLITUS WITHOUT COMPLICATION: Primary | ICD-10-CM

## 2024-08-15 DIAGNOSIS — Z96.41 INSULIN PUMP STATUS: ICD-10-CM

## 2024-08-15 PROCEDURE — 99999 PR PBB SHADOW E&M-EST. PATIENT-LVL IV: CPT | Mod: PBBFAC,,,

## 2024-08-15 NOTE — PROGRESS NOTES
Yamilka Azul is a 28 y.o. male presenting for follow-up of T1DM      History of Present Illness  T1DM  Diagnosed at age 15  Known complications: denies    Strong family history of type 1 diabetes: grandmother, maternal uncle, cousin's child, mother and sister with T1DM. We have done FREYA testing which was negative  Has been on pump therapy since time of diagnosis  Using Tandem TSlim with controlIQ    Changes made in last visit: 11:30 AM Carb ratio 1:8, ISF 1:35     Today patient reports doing well, enters carb consistently, but has post-prandial hyperglycemia everyday after lunch.      Current Diabetes Regimen:  Tandem TSlim with Control IQ- novolog    Pump settings:          Diet/Exercise:   Watches diet, fairly active  2 meals daily- Lunch and dinner  Coffee in morning,   Snacks sometimes- chips  Water for hydration  Bigger meals on weekends      Last Hgb A1C:  1/3/24; A1c 6.3%    Glucose Monitoring:  Meter/CGM: Dexcom G6  Reviewed and scanned into media          Hypoglycemic Episodes: rare episodes    DKA: never    Backup insulin: yes  Glucagon: yes    Screening / DM Complications:    Nephropathy:  ACEi/ARB: Not taking  Lab Results   Component Value Date    MICALBCREAT Unable to calculate 01/03/2024       Last Lipid Panel:  Statin: Not taking  Lab Results   Component Value Date    LDLCALC 151.4 01/03/2024       Last foot exam : 05/08/2023  Last eye exam : 05/14/2021;  no laser surgery or DR, due for dilated exam    Lab Results   Component Value Date    TSH 0.801 01/03/2024           Current Outpatient Medications:     albuterol (PROVENTIL/VENTOLIN HFA) 90 mcg/actuation inhaler, Inhale 2 puffs into the lungs every 4 (four) hours as needed for Wheezing. Rescue, Disp: 6.7 g, Rfl: 0    blood-glucose sensor (DEXCOM G6 SENSOR) Nette, 1 Device by Misc.(Non-Drug; Combo Route) route every 10 days., Disp: 9 each, Rfl: 3    blood-glucose transmitter (DEXCOM G6 TRANSMITTER) Nette, 1 Device by Misc.(Non-Drug; Combo  "Route) route every 3 (three) months., Disp: 1 each, Rfl: 3    diclofenac sodium (VOLTAREN) 1 % Gel, Apply 2 g topically 4 (four) times daily. for 3 days, Disp: 2 g, Rfl: 0    glucagon 1 mg/0.2 mL Syrg, Inject 1 mg into the skin daily as needed (low blood sugar)., Disp: 0.2 mL, Rfl: 5    infusion set for insulin pump (AUTOSOFT XC INFUSION SET 23") ISet, 1 each by Misc.(Non-Drug; Combo Route) route Every 3 (three) days., Disp: 30 each, Rfl: 3    insulin (BASAGLAR KWIKPEN U-100 INSULIN) glargine 100 units/mL (3mL) SubQ pen, Take 40 units daily in instance of pump failure, Disp: 3 Syringe, Rfl: 1    insulin pump cartridge (T:SLIM X2) Crtg, Inject 1 each into the skin Every 3 (three) days., Disp: 30 each, Rfl: 3    NOVOLOG U-100 INSULIN ASPART 100 unit/mL injection, Inject up to 100 units daily via insulin pump, Disp: 90 mL, Rfl: 3    ROS as above    Objective:     There were no vitals filed for this visit.    Wt Readings from Last 3 Encounters:   12/07/23 84.5 kg (186 lb 4.6 oz)   05/08/23 82.5 kg (181 lb 14.1 oz)   01/18/23 77.1 kg (170 lb)     There is no height or weight on file to calculate BMI.  Physical Exam  Constitutional:       Appearance: He is well-developed.   HENT:      Head: Normocephalic.   Eyes:      Conjunctiva/sclera: Conjunctivae normal.   Neck:      Comments: No thyromegaly  Pulmonary:      Effort: Pulmonary effort is normal.   Musculoskeletal:         General: Normal range of motion.   Skin:     General: Skin is warm.      Findings: No rash.      Comments: No lipohypertrophy   Neurological:      Mental Status: He is alert and oriented to person, place, and time.         Labs    Chemistry        Component Value Date/Time     01/03/2024 0958    K 4.2 01/03/2024 0958     01/03/2024 0958    CO2 27 01/03/2024 0958    BUN 16 01/03/2024 0958    CREATININE 1.0 01/03/2024 0958     (H) 01/03/2024 0958        Component Value Date/Time    CALCIUM 10.0 01/03/2024 0958    ALKPHOS 101 01/03/2024 " 0958    AST 25 01/03/2024 0958    ALT 52 (H) 01/03/2024 0958    BILITOT 0.9 01/03/2024 0958    ESTGFRAFRICA >60.0 07/23/2021 0806    EGFRNONAA >60.0 07/23/2021 0806              Assessment and Plan         1. Type 1 diabetes mellitus without complication  Assessment & Plan:  Pump and sensor download reviewed, TIR 72%.  Hyperglycemia after lunch which does not correct for several hours, lesser highs after dinner.  Strengthened carb ratio and ISF today.  Discussed upgrade to G7 with Tandem, patient will let us know when he is ready    Pump Settings (changes made today in bold)  Basal Rate  MN - 6A:               1.2 U/hr  6A - 11:30 A:         1.6 U/hr  11:30 - 5:30 P:      1.3 U/hr  5:30P - MN:          1.8 U/hr      Carb Ratio  MN - 6A:              1:35    6A - 11:30 A:        1:35   11:30- 5:30 P:      1:25   5:30P - MN:          1:35      ISF  MN - 6A:              1:9   6A - 11:30 A:       1:9   11:30- 5:30 P:     1:7 (If BG levels still high after a week, change to 1:6)  5:30P - MN:         1:9      Target:  120  mg/dl       2. Insulin pump status  Assessment & Plan:  As above            RTC 6 months.    Patient seen, examined and discussed with Dr. Clif Castillo MD  [facility]

## 2024-08-15 NOTE — PATIENT INSTRUCTIONS
Pump Settings (changes made in bold)  Basal Rate  MN - 6A:               1.2 U/hr  6A - 11:30 A:         1.6 U/hr  11:30 - 5:30 P:      1.3 U/hr  5:30P - MN:          1.8 U/hr      Carb Ratio  MN - 6A:              1:35    6A - 11:30 A:        1:35   11:30- 5:30 P:      1:25   5:30P - MN:          1:35      ISF  MN - 6A:              1:9   6A - 11:30 A:       1:9   11:30- 5:30 P:     1:7 (If BG levels still high after a week, change to 1:6)  5:30P - MN:         1:9      Target:  120  mg/dl

## 2024-08-16 NOTE — ASSESSMENT & PLAN NOTE
Pump and sensor download reviewed, TIR 72%.  Hyperglycemia after lunch which does not correct for several hours, lesser highs after dinner.  Strengthened carb ratio and ISF today.  Discussed upgrade to G7 with Tandem, patient will let us know when he is ready    Pump Settings (changes made today in bold)  Basal Rate  MN - 6A:               1.2 U/hr  6A - 11:30 A:         1.6 U/hr  11:30 - 5:30 P:      1.3 U/hr  5:30P - MN:          1.8 U/hr      Carb Ratio  MN - 6A:              1:35    6A - 11:30 A:        1:35   11:30- 5:30 P:      1:25   5:30P - MN:          1:35      ISF  MN - 6A:              1:9   6A - 11:30 A:       1:9   11:30- 5:30 P:     1:7 (If BG levels still high after a week, change to 1:6)  5:30P - MN:         1:9      Target:  120  mg/dl

## 2024-08-22 DIAGNOSIS — E10.9 TYPE 1 DIABETES MELLITUS WITHOUT COMPLICATION: ICD-10-CM

## 2024-08-22 RX ORDER — BLOOD-GLUCOSE TRANSMITTER
1 EACH MISCELLANEOUS
Qty: 1 EACH | Refills: 3 | Status: SHIPPED | OUTPATIENT
Start: 2024-08-22

## 2024-08-22 RX ORDER — BLOOD-GLUCOSE SENSOR
1 EACH MISCELLANEOUS
Qty: 9 EACH | Refills: 3 | Status: SHIPPED | OUTPATIENT
Start: 2024-08-22 | End: 2025-08-22

## 2024-08-22 NOTE — TELEPHONE ENCOUNTER
----- Message from Sandy Zacarias sent at 8/22/2024 12:52 PM CDT -----  .Type:  RX Refill Request    Who Called? PT     Refill or New Rx? REFILLS     RX Name and Strength?     blood-glucose transmitter (DEXCOM G6 TRANSMITTER) Nette  blood-glucose sensor (DEXCOM G6 SENSOR) Nette       Preferred Pharmacy with phone number? [facility] [address] [place]     Pt Call Back Number? [phone]    Additional Information: PLEASE SEND THIS TODAY PT IS COMPLETLEY OUT. THE [facility] DIDN'T HAVE A PRESCRIPTION FOR THIS. SO PT WANTS IT TO GO TO A LOCAL [facility] TO GET IT FASTER . PT NO LONGER WILL USE THE [facility] [address] HE MOVED THE LOCAL [facility] HAS BEEN UPDATED TO A [facility]     Thank You

## 2024-10-23 PROBLEM — Z00.00 WELLNESS EXAMINATION: Status: ACTIVE | Noted: 2024-10-23

## 2024-11-19 ENCOUNTER — PATIENT MESSAGE (OUTPATIENT)
Dept: ENDOCRINOLOGY | Facility: CLINIC | Age: 29
End: 2024-11-19
Payer: COMMERCIAL

## 2024-11-19 DIAGNOSIS — E10.9 TYPE 1 DIABETES MELLITUS WITHOUT COMPLICATION: Primary | ICD-10-CM

## 2024-11-19 RX ORDER — BLOOD-GLUCOSE SENSOR
1 EACH MISCELLANEOUS
Qty: 3 EACH | Refills: 11 | Status: SHIPPED | OUTPATIENT
Start: 2024-11-19 | End: 2025-11-19

## 2025-07-25 DIAGNOSIS — E10.9 TYPE 1 DIABETES MELLITUS WITHOUT COMPLICATION: ICD-10-CM

## 2025-07-25 DIAGNOSIS — Z96.41 INSULIN PUMP STATUS: ICD-10-CM

## 2025-07-25 RX ORDER — INSULIN PUMP CARTRIDGE
1 CARTRIDGE (EA) SUBCUTANEOUS
Qty: 30 EACH | Refills: 3 | Status: SHIPPED | OUTPATIENT
Start: 2025-07-25 | End: 2026-07-25

## 2025-07-25 RX ORDER — INFUSION SET FOR INSULIN PUMP
1 INFUSION SETS-PARAPHERNALIA MISCELLANEOUS
Qty: 30 EACH | Refills: 3 | Status: SHIPPED | OUTPATIENT
Start: 2025-07-25 | End: 2026-07-25

## 2025-08-18 DIAGNOSIS — E10.9 TYPE 1 DIABETES MELLITUS WITHOUT COMPLICATION: ICD-10-CM

## 2025-08-18 RX ORDER — INSULIN ASPART 100 [IU]/ML
INJECTION, SOLUTION INTRAVENOUS; SUBCUTANEOUS
Qty: 90 ML | Refills: 3 | Status: SHIPPED | OUTPATIENT
Start: 2025-08-18

## 2025-08-21 ENCOUNTER — OFFICE VISIT (OUTPATIENT)
Dept: ENDOCRINOLOGY | Facility: CLINIC | Age: 30
End: 2025-08-21
Payer: COMMERCIAL

## 2025-08-21 ENCOUNTER — PATIENT MESSAGE (OUTPATIENT)
Dept: ENDOCRINOLOGY | Facility: CLINIC | Age: 30
End: 2025-08-21

## 2025-08-21 VITALS
WEIGHT: 177.5 LBS | HEIGHT: 70 IN | BODY MASS INDEX: 25.41 KG/M2 | DIASTOLIC BLOOD PRESSURE: 76 MMHG | SYSTOLIC BLOOD PRESSURE: 124 MMHG

## 2025-08-21 DIAGNOSIS — R79.89 LFTS ABNORMAL: ICD-10-CM

## 2025-08-21 DIAGNOSIS — Z96.41 INSULIN PUMP STATUS: ICD-10-CM

## 2025-08-21 DIAGNOSIS — E10.9 TYPE 1 DIABETES MELLITUS WITHOUT COMPLICATION: Primary | ICD-10-CM

## 2025-08-21 PROCEDURE — 99999 PR PBB SHADOW E&M-EST. PATIENT-LVL IV: CPT | Mod: PBBFAC,,,

## 2025-08-26 ENCOUNTER — LAB VISIT (OUTPATIENT)
Dept: LAB | Facility: HOSPITAL | Age: 30
End: 2025-08-26
Payer: COMMERCIAL

## 2025-08-26 DIAGNOSIS — E10.9 TYPE 1 DIABETES MELLITUS WITHOUT COMPLICATION: ICD-10-CM

## 2025-08-26 LAB
ALBUMIN SERPL BCP-MCNC: 4.4 G/DL (ref 3.5–5.2)
ALBUMIN/CREAT UR: 2.8 UG/MG
ALP SERPL-CCNC: 92 UNIT/L (ref 40–150)
ALT SERPL W/O P-5'-P-CCNC: 101 UNIT/L (ref 0–55)
ANION GAP (OHS): 8 MMOL/L (ref 8–16)
AST SERPL-CCNC: 50 UNIT/L (ref 0–50)
BILIRUB SERPL-MCNC: 0.8 MG/DL (ref 0.1–1)
BUN SERPL-MCNC: 13 MG/DL (ref 6–20)
CALCIUM SERPL-MCNC: 9.4 MG/DL (ref 8.7–10.5)
CHLORIDE SERPL-SCNC: 107 MMOL/L (ref 95–110)
CHOLEST SERPL-MCNC: 198 MG/DL (ref 120–199)
CHOLEST/HDLC SERPL: 2.6 {RATIO} (ref 2–5)
CO2 SERPL-SCNC: 27 MMOL/L (ref 23–29)
CREAT SERPL-MCNC: 0.9 MG/DL (ref 0.5–1.4)
CREAT UR-MCNC: 283 MG/DL (ref 23–375)
EAG (OHS): 123 MG/DL (ref 68–131)
GFR SERPLBLD CREATININE-BSD FMLA CKD-EPI: >60 ML/MIN/1.73/M2
GLUCOSE SERPL-MCNC: 101 MG/DL (ref 70–110)
HBA1C MFR BLD: 5.9 % (ref 4–5.6)
HDLC SERPL-MCNC: 77 MG/DL (ref 40–75)
HDLC SERPL: 38.9 % (ref 20–50)
LDLC SERPL CALC-MCNC: 109.6 MG/DL (ref 63–159)
MICROALBUMIN UR-MCNC: 8 UG/ML
NONHDLC SERPL-MCNC: 121 MG/DL
POTASSIUM SERPL-SCNC: 4 MMOL/L (ref 3.5–5.1)
PROT SERPL-MCNC: 6.9 GM/DL (ref 6–8.4)
SODIUM SERPL-SCNC: 142 MMOL/L (ref 136–145)
TRIGL SERPL-MCNC: 57 MG/DL (ref 30–150)
TSH SERPL-ACNC: 1.04 UIU/ML (ref 0.4–4)

## 2025-08-26 PROCEDURE — 36415 COLL VENOUS BLD VENIPUNCTURE: CPT

## 2025-08-26 PROCEDURE — 83036 HEMOGLOBIN GLYCOSYLATED A1C: CPT

## 2025-08-26 PROCEDURE — 82040 ASSAY OF SERUM ALBUMIN: CPT

## 2025-08-26 PROCEDURE — 82043 UR ALBUMIN QUANTITATIVE: CPT

## 2025-08-26 PROCEDURE — 80061 LIPID PANEL: CPT

## 2025-08-26 PROCEDURE — 84443 ASSAY THYROID STIM HORMONE: CPT

## 2025-08-29 ENCOUNTER — E-CONSULT (OUTPATIENT)
Dept: GASTROENTEROLOGY | Facility: HOSPITAL | Age: 30
End: 2025-08-29
Payer: COMMERCIAL

## 2025-08-29 ENCOUNTER — PATIENT MESSAGE (OUTPATIENT)
Dept: ENDOCRINOLOGY | Facility: CLINIC | Age: 30
End: 2025-08-29
Payer: COMMERCIAL

## 2025-08-29 DIAGNOSIS — R79.89 LFTS ABNORMAL: Primary | ICD-10-CM

## 2025-08-29 DIAGNOSIS — R74.8 ELEVATED LIVER ENZYMES: Primary | ICD-10-CM
